# Patient Record
Sex: FEMALE | Race: WHITE | NOT HISPANIC OR LATINO | URBAN - METROPOLITAN AREA
[De-identification: names, ages, dates, MRNs, and addresses within clinical notes are randomized per-mention and may not be internally consistent; named-entity substitution may affect disease eponyms.]

---

## 2018-04-30 ENCOUNTER — OFFICE VISIT (OUTPATIENT)
Dept: OBGYN | Facility: CLINIC | Age: 33
End: 2018-04-30
Payer: COMMERCIAL

## 2018-04-30 VITALS
HEART RATE: 62 BPM | WEIGHT: 201 LBS | BODY MASS INDEX: 28.77 KG/M2 | DIASTOLIC BLOOD PRESSURE: 58 MMHG | SYSTOLIC BLOOD PRESSURE: 112 MMHG | HEIGHT: 70 IN

## 2018-04-30 DIAGNOSIS — Z80.0 FAMILY HISTORY OF COLON CANCER: ICD-10-CM

## 2018-04-30 DIAGNOSIS — Z01.419 ENCOUNTER FOR GYNECOLOGICAL EXAMINATION WITHOUT ABNORMAL FINDING: Primary | ICD-10-CM

## 2018-04-30 DIAGNOSIS — Z97.5 PRESENCE OF INTRAUTERINE CONTRACEPTIVE DEVICE: ICD-10-CM

## 2018-04-30 PROCEDURE — 99395 PREV VISIT EST AGE 18-39: CPT | Performed by: OBSTETRICS & GYNECOLOGY

## 2018-04-30 ASSESSMENT — PATIENT HEALTH QUESTIONNAIRE - PHQ9: 5. POOR APPETITE OR OVEREATING: NOT AT ALL

## 2018-04-30 ASSESSMENT — ANXIETY QUESTIONNAIRES
1. FEELING NERVOUS, ANXIOUS, OR ON EDGE: NOT AT ALL
GAD7 TOTAL SCORE: 0
3. WORRYING TOO MUCH ABOUT DIFFERENT THINGS: NOT AT ALL
7. FEELING AFRAID AS IF SOMETHING AWFUL MIGHT HAPPEN: NOT AT ALL
2. NOT BEING ABLE TO STOP OR CONTROL WORRYING: NOT AT ALL
5. BEING SO RESTLESS THAT IT IS HARD TO SIT STILL: NOT AT ALL
6. BECOMING EASILY ANNOYED OR IRRITABLE: NOT AT ALL
IF YOU CHECKED OFF ANY PROBLEMS ON THIS QUESTIONNAIRE, HOW DIFFICULT HAVE THESE PROBLEMS MADE IT FOR YOU TO DO YOUR WORK, TAKE CARE OF THINGS AT HOME, OR GET ALONG WITH OTHER PEOPLE: NOT DIFFICULT AT ALL

## 2018-04-30 NOTE — PROGRESS NOTES
Leandra is a 32 year old  female who presents for annual exam.     Besides routine health maintenance, she has no other health concerns today .    HPI:  The patient's PCP is [facility].    Patient is doing great.  Got an IUD after xander was born and not having any periods with it. Occasionally gets a little cramp or something but nothing more than that so loves it  Just discussed with Kvng if they want to remove it and start trying and decided to give it one more year  Leandra is an only child so would be fine with just one and Kvng would prefer one more. Likely will but will give it one more year  Just bought a house with bigger yard and really enjoying that  Hasn't had fasting labs in years if ever. Not fasting today. No biologic fhx of heart issues or DM/HTN  Patient's biological mom had colon ca at 46 and her mom had it early 50s. Not sure if she's done gene testing but did see a genetic counselor and told Leandra that they don't think there's a zambrano syndrome. Not sure if that's based on pedigree only or if she did do the lab testing. Leandra herself has no GI complaints  Her  Kvng has had years of GI issues and didn't go in until recently and found to have Crohn's. Just started remicade and is doing better      GYNECOLOGIC HISTORY:    No LMP recorded. Patient is not currently having periods (Reason: IUD).  Her current contraception method is: IUD.  She  reports that she has never smoked. She has never used smokeless tobacco.    Patient is sexually active.  STD testing offered?  Declined  Last PHQ-9 score on record =   PHQ-9 SCORE 2018   Total Score 0     Last GAD7 score on record =   SELIN-7 SCORE 2018   Total Score 0     Alcohol Score = 3    HEALTH MAINTENANCE:  Cholesterol: None found.  Last Mammo: Never, Result: not applicable  Pap:   Lab Results   Component Value Date    PAP NIL, HPV- 2016    PAP neg, -HPV 2013    PAP neg, neg 2011      Colonoscopy:  Never, Result:  "not applicable  Dexa:  Never    Health maintenance updated:  yes    HISTORY:  Obstetric History       T1      L1     SAB0   TAB0   Ectopic0   Multiple0   Live Births1       # Outcome Date GA Lbr Wei/2nd Weight Sex Delivery Anes PTL Lv   1 Term 16 40w3d 05:30 :58 7 lb 14.6 oz (3.59 kg) F Vag-Spont EPI N CELE      Name: Melina      Apgar1:  9                Apgar5: 9          Patient Active Problem List   Diagnosis     Family history of colon cancer     Presence of intrauterine contraceptive device     Past Surgical History:   Procedure Laterality Date     HC TOOTH EXTRACTION W/FORCEP      wisdom teeth extraction     tubes in ears        Social History   Substance Use Topics     Smoking status: Never Smoker     Smokeless tobacco: Never Used     Alcohol use 0.0 oz/week     0 Standard drinks or equivalent per week      Problem (# of Occurrences) Relation (Name,Age of Onset)    Colon Cancer (2) Maternal Grandmother (50), Mother (46)    Unknown/Adopted (1) Other            Current Outpatient Prescriptions   Medication Sig     levonorgestrel (MIRENA) 20 MCG/24HR IUD 1 each (20 mcg) by Intrauterine route once for 1 dose     No current facility-administered medications for this visit.      Facility-Administered Medications Ordered in Other Visits   Medication     lidocaine-EPINEPHrine 1.5 %-1:[id number] injection     ROPivacaine (NAROPIN) injection     Allergies   Allergen Reactions     Amoxicillin      Penicillins        Past medical, surgical, social and family histories were reviewed and updated in EPIC.    ROS:   12 point review of systems negative other than symptoms noted below.    EXAM:  /58  Pulse 62  Ht 5' 9.75\" (1.772 m)  Wt 201 lb (91.2 kg)  BMI 29.05 kg/m2   BMI: Body mass index is 29.05 kg/(m^2).    PHYSICAL EXAM:  Constitutional:  Appearance: Well nourished, well developed, alert, in no acute distress  Neck:  Lymph Nodes:  No lymphadenopathy present    Thyroid:  Gland size normal, " nontender, no nodules or masses present  on palpation  Chest:  Respiratory Effort:  Breathing unlabored  Cardiovascular:    Heart: Auscultation:  Regular rate, normal rhythm, no murmurs present  Breasts: Palpation of Breasts and Axillae:  No masses present on palpation, no breast tenderness. and No nodularity, asymmetry or nipple discharge bilaterally.  Gastrointestinal:   Abdominal Examination:  Abdomen nontender to palpation, tone normal without rigidity or guarding, no masses present, umbilicus without lesions   Liver and Spleen:  No hepatomegaly present, liver nontender to palpation    Hernias:  No hernias present  Lymphatic: Lymph Nodes:  No other lymphadenopathy present  Skin:  General Inspection:  No rashes present, no lesions present, no areas of  discoloration    Genitalia and Groin:  No rashes present, no lesions present, no areas of  discoloration, no masses present  Neurologic/Psychiatric:    Mental Status:  Oriented X3     Pelvic Exam:  External Genitalia:     Normal appearance for age, no discharge present, no tenderness present, no inflammatory lesions present, color normal  Vagina:    Normal vaginal vault without central or paravaginal defects, no discharge present, no inflammatory lesions present, no masses present  Bladder:     Nontender to palpation  Urethra:   Urethral Body:  Urethra palpation normal, urethra structural support normal   Urethral Meatus:  No erythema or lesions present  Cervix:     Appearance healthy, no lesions present, nontender to palpation, no bleeding present, string present  Uterus:     Nontender to palpation, no masses present, position anteflexed, mobility: normal  Adnexa:     No adnexal tenderness present, no adnexal masses present  Perineum:     Perineum within normal limits, no evidence of trauma, no rashes or skin lesions present  Anus:     Anus within normal limits, no hemorrhoids present  Inguinal Lymph Nodes:     No lymphadenopathy present  Pubic Hair:     Normal  pubic hair distribution for age  Genitalia and Groin:     No rashes present, no lesions present, no areas of discoloration, no masses present      COUNSELING:   Reviewed preventive health counseling, as reflected in patient instructions    BMI: Body mass index is 29.05 kg/(m^2).  Weight management plan: Discussed healthy diet and exercise guidelines and patient will follow up in 12 months in clinic to re-evaluate.    ASSESSMENT:  32 year old female with satisfactory annual exam.    ICD-10-CM    1. Encounter for gynecological examination without abnormal finding Z01.419    2. Presence of intrauterine contraceptive device Z97.5    3. Family history of colon cancer Z80.0        PLAN:  Pap is UTD for 3 more years    Discussed conception, IUD removal, etc. Will wait until next year    Discussed her genetic history of colon cancer. If her mom is gene negative then would start colonoscoyp at age 36 so that 10 yrs younger than her birth mom at dx. If her birth mother hasn't had gene testing and Leandra would like to do it we could do that and if positive would start screening even at this current age. Patient will get more details and let me know    Will plan fasting labs next year    Megha Emanuel MD

## 2018-04-30 NOTE — MR AVS SNAPSHOT
"              After Visit Summary   2018    Leandra Brush    MRN: [id number]           Patient Information     Date Of Birth          1985        Visit Information        Provider Department      2018 10:50 AM Megha Emanuel MD [facility]        Today's Diagnoses     Encounter for gynecological examination without abnormal finding    -  1    Presence of intrauterine contraceptive device        Family history of colon cancer           Follow-ups after your visit        Who to contact     If you have questions or need follow up information about today's clinic visit or your schedule please contact [facility] directly at [phone].  Normal or non-critical lab and imaging results will be communicated to you by [employer], letter or phone within 4 business days after the clinic has received the results. If you do not hear from us within 7 days, please contact the clinic through [employer] or phone. If you have a critical or abnormal lab result, we will notify you by phone as soon as possible.  Submit refill requests through [employer] or call your pharmacy and they will forward the refill request to us. Please allow 3 business days for your refill to be completed.          Additional Information About Your Visit        MyChart Information     CPUsage lets you send messages to your doctor, view your test results, renew your prescriptions, schedule appointments and more. To sign up, go to [email or web address] . Click on \"Log in\" on the left side of the screen, which will take you to the Welcome page. Then click on \"Sign up Now\" on the right side of the page.     You will be asked to enter the access code listed below, as well as some personal information. Please follow the directions to create your username and password.     Your access code is: [email or web address]  Expires: 2018 12:03 PM     Your access code will  in 90 days. If you need help or a new code, please " "call your [facility] clinic or [phone].        Care EveryWhere ID     This is your Care EveryWhere ID. This could be used by other organizations to access your [facility] medical records  [id number]        Your Vitals Were     Pulse Height BMI (Body Mass Index)             62 5' 9.75\" (1.772 m) 29.05 kg/m2          Blood Pressure from Last 3 Encounters:   04/30/18 112/58   04/04/16 102/64   03/07/16 112/72    Weight from Last 3 Encounters:   04/30/18 201 lb (91.2 kg)   04/04/16 209 lb (94.8 kg)   03/07/16 206 lb (93.4 kg)              Today, you had the following     No orders found for display       Primary Care Provider Office Phone # Fax #    [facility] [phone]       [facility] [address] [address]        Equal Access to Services     [facility]OBERT [facility] HospitalETHEL : Hadii aad ku hadasho Soomaali, waaxda luqadaha, qaybta kaalmada adeegyada, waxay idiin haywilmarn miladis gar . So [facility] [phone].    ATENCIÓN: Si habla español, tiene a carrizales disposición servicios gratuitos de asistencia lingüística. Rosendo al [phone].    We comply with applicable federal civil rights laws and [place] laws. We do not discriminate on the basis of race, color, national origin, age, disability, sex, sexual orientation, or gender identity.            Thank you!     Thank you for choosing [facility]  for your care. Our goal is always to provide you with excellent care. Hearing back from our patients is one way we can continue to improve our services. Please take a few minutes to complete the written survey that you may receive in the mail after your visit with us. Thank you!             Your Updated Medication List - Protect others around you: Learn how to safely use, store and throw away your medicines at www.disposemymeds.org.          This list is accurate as of 4/30/18 11:59 PM.  Always use your most recent med list.                   Brand Name Dispense " Instructions for use Diagnosis    levonorgestrel 20 MCG/24HR IUD    MIRENA (52 MG)    1 each    1 each (20 mcg) by Intrauterine route once for 1 dose    Encounter for IUD insertion

## 2018-05-01 ASSESSMENT — ANXIETY QUESTIONNAIRES: GAD7 TOTAL SCORE: 0

## 2018-05-01 ASSESSMENT — PATIENT HEALTH QUESTIONNAIRE - PHQ9: SUM OF ALL RESPONSES TO PHQ QUESTIONS 1-9: 0

## 2019-05-02 NOTE — PROGRESS NOTES
Leandra is a 33 year old  female who presents for annual exam.     Besides routine health maintenance, she has no other health concerns today .    HPI:  The patient's PCP is [facility].      Patient is doing well. Has loved her IUD these last three yrs and almost never gets any period at all. When she does it's almost always just a little brown smudge and not more.    Have really been on the fence about having another baby. She would be fine with just their daughter.  a little more ready but would be ok with just her too. Melina really asking for a baby sister.  Would have ideally tried for a baby 9 months ago so could be delivering now but her dad got sick with throat cancer and just . Never smoked or drank. Not sure if it was HPV related or not. So put it off b/c of that. 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 If don't get pregnant in that time frame is actually going to come back in early  and replace her IUD b/c likely won't keep trying after that.    General health is fine. Has gained some weight and knows it. Just hasn't had as much time to exercise or focus on diet with a 3 year old but then also with all the things going on with her dad. Hoping to get more focused on it this summer since off from school and things have settled down.    Taking a PNV but not very consistently. Going to recommit to that now that IUd is coming out      GYNECOLOGIC HISTORY:    No LMP recorded. (Menstrual status: IUD).  Her current contraception method is: IUD.  She  reports that she has never smoked. She has never used smokeless tobacco.    Patient is sexually active.  STD testing offered?  Declined  Last PHQ-9 score on record =   PHQ-9 SCORE 5/3/2019   PHQ-9 Total Score 0     Last GAD7 score on record =   SELIN-7 SCORE 5/3/2019   Total Score 0     Alcohol Score = 2    HEALTH MAINTENANCE:  Cholesterol: N/A  Last Mammo:  "never, Next Mammo: age 40  Pap: HPV: negative  Lab Results   Component Value Date    PAP NIL 2016    PAP neg, -HPV 2013    PAP neg, neg 2011      Colonoscopy:  never, Next Colonoscopy: age 50.  Dexa:  N/A    Health maintenance updated:  yes    HISTORY:  OB History    Para Term  AB Living   1 1 1 0 0 1   SAB TAB Ectopic Multiple Live Births   0 0 0 0 1      # Outcome Date GA Lbr Wei/2nd Weight Sex Delivery Anes PTL Lv   1 Term 16 40w3d 05:30 :58 3.59 kg (7 lb 14.6 oz) F Vag-Spont EPI N CELE      Birth Comments: followed and delivered by Adelfo      Name: Melina      Apgar1: 9  Apgar5: 9       Patient Active Problem List   Diagnosis     Family history of colon cancer     Past Surgical History:   Procedure Laterality Date     HC TOOTH EXTRACTION W/FORCEP      wisdom teeth extraction     tubes in ears        Social History     Tobacco Use     Smoking status: Never Smoker     Smokeless tobacco: Never Used   Substance Use Topics     Alcohol use: Yes     Alcohol/week: 0.0 oz      Problem (# of Occurrences) Relation (Name,Age of Onset)    Colon Cancer (2) Maternal Grandmother (50), Mother (46)    Unknown/Adopted (1) Other            No current outpatient medications on file.     No current facility-administered medications for this visit.      Allergies   Allergen Reactions     Amoxicillin      Penicillins        Past medical, surgical, social and family histories were reviewed and updated in EPIC.    ROS:   12 point review of systems negative other than symptoms noted below.    EXAM:  BP 96/56 (BP Location: Right arm, Patient Position: Sitting, Cuff Size: Adult Large)   Pulse 64   Ht 1.778 m (5' 10\")   Wt 96.3 kg (212 lb 6.4 oz)   Breastfeeding? No   BMI 30.48 kg/m     BMI: Body mass index is 30.48 kg/m .    PHYSICAL EXAM:  Constitutional:  Appearance: Well nourished, well developed, alert, in no acute distress  Neck:  Lymph Nodes:  No lymphadenopathy present    Thyroid:  " Gland size normal, nontender, no nodules or masses present  on palpation  Chest:  Respiratory Effort:  Breathing unlabored  Cardiovascular:    Heart: Auscultation:  Regular rate, normal rhythm, no murmurs present  Breasts: Palpation of Breasts and Axillae:  No masses present on palpation, no breast tenderness. and No nodularity, asymmetry or nipple discharge bilaterally.  Gastrointestinal:   Abdominal Examination:  Abdomen nontender to palpation, tone normal without rigidity or guarding, no masses present, umbilicus without lesions   Liver and Spleen:  No hepatomegaly present, liver nontender to palpation    Hernias:  No hernias present  Lymphatic: Lymph Nodes:  No other lymphadenopathy present  Skin:  General Inspection:  No rashes present, no lesions present, no areas of  discoloration    Genitalia and Groin:  No rashes present, no lesions present, no areas of  discoloration, no masses present  Neurologic/Psychiatric:    Mental Status:  Oriented X3     Pelvic Exam:  External Genitalia:     Normal appearance for age, no discharge present, no tenderness present, no inflammatory lesions present, color normal  Vagina:    Normal vaginal vault without central or paravaginal defects, no discharge present, no inflammatory lesions present, no masses present  Bladder:     Nontender to palpation  Urethra:   Urethral Body:  Urethra palpation normal, urethra structural support normal   Urethral Meatus:  No erythema or lesions present  Cervix:     Appearance healthy, no lesions present, nontender to palpation, no bleeding present, string present  Uterus:     Nontender to palpation, no masses present, position anteflexed, mobility: normal  Adnexa:     No adnexal tenderness present, no adnexal masses present  Perineum:     Perineum within normal limits, no evidence of trauma, no rashes or skin lesions present  Anus:     Anus within normal limits, no hemorrhoids present  Inguinal Lymph Nodes:     No lymphadenopathy present  Pubic  Hair:     Normal pubic hair distribution for age  Genitalia and Groin:     No rashes present, no lesions present, no areas of discoloration, no masses present      COUNSELING:   Reviewed preventive health counseling, as reflected in patient instructions  Special attention given to:        Family planning    BMI: Body mass index is 30.48 kg/m .  Weight management plan: Discussed healthy diet and exercise guidelines    ASSESSMENT:  33 year old female with satisfactory annual exam.    ICD-10-CM    1. Encounter for gynecological examination without abnormal finding Z01.419 Pap imaged thin layer screen with HPV - recommended age 30 - 65     HPV High Risk Types DNA Cervical   2. Encounter for removal of intrauterine contraceptive device Z30.432 REMOVE INTRAUTERINE DEVICE     REMOVAL NON-BIODEGRADABLE DRUG DELIVERY IMPLANT   3. Procreative management Z31.9        PLAN:  Pap is UTD for 2 more years  IUD removed as per note below  Discussed conceiving, timing, PNV, making sure she is comfortable with really not trying after only 3-4 months and replacing the IUD vs something shorter terms and then trying once more next summer. Will think about that but likely would just replace the IUD and not keep trying    Megha Emanuel MD    IUD Removal:  SUBJECTIVE:    Is a pregnancy test required: No.  Was a consent obtained?  Yes    Leandra Brush is a 33 year old female,, No LMP recorded. (Menstrual status: IUD). who presents today for IUD removal. Her current IUD was placed 3 years ago. She has not had problems with the IUD. She requests removal of the IUD because she desires to conceive    Today's PHQ-2 Score:   PHQ-2 (  [employer]) 2016   Q1: Little interest or pleasure in doing things 0   Q2: Feeling down, depressed or hopeless 0   PHQ-2 Score 0       PROCEDURE:    A speculum exam was performed and the cervix was visualized. The IUD string was visualized. Using ring forceps, the string  was grasped and the IUD removed  intact.    POST PROCEDURE:    The patient tolerated the procedure well. Patient was discharged in stable condition.    Call if bleeding, pain or fever occur. and Pregnancy counseling given, including folic acid supplementation 800-1000 mg per day.    Megha Emanuel MD

## 2019-05-03 ENCOUNTER — OFFICE VISIT (OUTPATIENT)
Dept: OBGYN | Facility: CLINIC | Age: 34
End: 2019-05-03
Payer: COMMERCIAL

## 2019-05-03 VITALS
HEART RATE: 64 BPM | WEIGHT: 212.4 LBS | HEIGHT: 70 IN | DIASTOLIC BLOOD PRESSURE: 56 MMHG | BODY MASS INDEX: 30.41 KG/M2 | SYSTOLIC BLOOD PRESSURE: 96 MMHG

## 2019-05-03 DIAGNOSIS — Z31.9 PROCREATIVE MANAGEMENT: ICD-10-CM

## 2019-05-03 DIAGNOSIS — Z30.432 ENCOUNTER FOR REMOVAL OF INTRAUTERINE CONTRACEPTIVE DEVICE: ICD-10-CM

## 2019-05-03 DIAGNOSIS — Z01.419 ENCOUNTER FOR GYNECOLOGICAL EXAMINATION WITHOUT ABNORMAL FINDING: Primary | ICD-10-CM

## 2019-05-03 PROCEDURE — 58301 REMOVE INTRAUTERINE DEVICE: CPT | Performed by: OBSTETRICS & GYNECOLOGY

## 2019-05-03 PROCEDURE — 99395 PREV VISIT EST AGE 18-39: CPT | Mod: 25 | Performed by: OBSTETRICS & GYNECOLOGY

## 2019-05-03 ASSESSMENT — PATIENT HEALTH QUESTIONNAIRE - PHQ9
SUM OF ALL RESPONSES TO PHQ QUESTIONS 1-9: 0
5. POOR APPETITE OR OVEREATING: NOT AT ALL

## 2019-05-03 ASSESSMENT — MIFFLIN-ST. JEOR: SCORE: 1748.69

## 2019-05-03 ASSESSMENT — ANXIETY QUESTIONNAIRES
2. NOT BEING ABLE TO STOP OR CONTROL WORRYING: NOT AT ALL
1. FEELING NERVOUS, ANXIOUS, OR ON EDGE: NOT AT ALL
IF YOU CHECKED OFF ANY PROBLEMS ON THIS QUESTIONNAIRE, HOW DIFFICULT HAVE THESE PROBLEMS MADE IT FOR YOU TO DO YOUR WORK, TAKE CARE OF THINGS AT HOME, OR GET ALONG WITH OTHER PEOPLE: NOT DIFFICULT AT ALL
3. WORRYING TOO MUCH ABOUT DIFFERENT THINGS: NOT AT ALL
GAD7 TOTAL SCORE: 0
7. FEELING AFRAID AS IF SOMETHING AWFUL MIGHT HAPPEN: NOT AT ALL
6. BECOMING EASILY ANNOYED OR IRRITABLE: NOT AT ALL
5. BEING SO RESTLESS THAT IT IS HARD TO SIT STILL: NOT AT ALL

## 2019-05-03 NOTE — LETTER
May 13, 2019    Leandra Brush  [address]  [place] [address]    Dear ,  This letter is regarding your recent Pap smear (cervical cancer screening) and Human Papillomavirus (HPV) test.  We are happy to inform you that your Pap smear result is normal. Cervical cancer is closely linked with certain types of HPV. Your results showed no evidence of high-risk HPV.  Therefore we recommend you return in 5 years for your next pap smear and HPV test.  You will still need to return to the clinic every year for an annual exam and other preventive tests.  If you have additional questions regarding this result, please call our registered nurse, Lynn at [phone].  Sincerely,    Megha Emanuel MD/kiya

## 2019-05-04 ASSESSMENT — ANXIETY QUESTIONNAIRES: GAD7 TOTAL SCORE: 0

## 2019-05-06 PROCEDURE — 87624 HPV HI-RISK TYP POOLED RSLT: CPT | Performed by: OBSTETRICS & GYNECOLOGY

## 2019-05-06 PROCEDURE — G0145 SCR C/V CYTO,THINLAYER,RESCR: HCPCS | Performed by: OBSTETRICS & GYNECOLOGY

## 2019-05-09 LAB
COPATH REPORT: NORMAL
PAP: NORMAL

## 2019-05-10 LAB
FINAL DIAGNOSIS: NORMAL
HPV HR 12 DNA CVX QL NAA+PROBE: NEGATIVE
HPV16 DNA SPEC QL NAA+PROBE: NEGATIVE
HPV18 DNA SPEC QL NAA+PROBE: NEGATIVE
SPECIMEN DESCRIPTION: NORMAL
SPECIMEN SOURCE CVX/VAG CYTO: NORMAL

## 2019-10-03 DIAGNOSIS — O36.80X0 PREGNANCY WITH INCONCLUSIVE FETAL VIABILITY: Primary | ICD-10-CM

## 2019-10-03 NOTE — PROGRESS NOTES
Pt has NOB scheduled with ultrasound. Needed order to be placed. Future ultrasound order placed and linked with appt. Closing encounter.   Jazmine Dial RN on 10/3/2019 at 2:54 PM

## 2019-10-07 ENCOUNTER — PRENATAL OFFICE VISIT (OUTPATIENT)
Dept: OBGYN | Facility: CLINIC | Age: 34
End: 2019-10-07
Payer: COMMERCIAL

## 2019-10-07 ENCOUNTER — ANCILLARY PROCEDURE (OUTPATIENT)
Dept: ULTRASOUND IMAGING | Facility: CLINIC | Age: 34
End: 2019-10-07
Payer: COMMERCIAL

## 2019-10-07 VITALS
WEIGHT: 215.8 LBS | SYSTOLIC BLOOD PRESSURE: 105 MMHG | HEART RATE: 73 BPM | HEIGHT: 70 IN | BODY MASS INDEX: 30.9 KG/M2 | DIASTOLIC BLOOD PRESSURE: 61 MMHG

## 2019-10-07 DIAGNOSIS — O36.80X0 PREGNANCY WITH INCONCLUSIVE FETAL VIABILITY: ICD-10-CM

## 2019-10-07 DIAGNOSIS — Z34.81 SUPERVISION OF NORMAL INTRAUTERINE PREGNANCY IN MULTIGRAVIDA, FIRST TRIMESTER: Primary | ICD-10-CM

## 2019-10-07 LAB
ABO + RH BLD: NORMAL
ABO + RH BLD: NORMAL
ALBUMIN UR-MCNC: NEGATIVE MG/DL
APPEARANCE UR: CLEAR
BILIRUB UR QL STRIP: NEGATIVE
BLD GP AB SCN SERPL QL: NORMAL
BLOOD BANK CMNT PATIENT-IMP: NORMAL
COLOR UR AUTO: YELLOW
ERYTHROCYTE [DISTWIDTH] IN BLOOD BY AUTOMATED COUNT: 13.1 % (ref 10–15)
GLUCOSE UR STRIP-MCNC: NEGATIVE MG/DL
HCT VFR BLD AUTO: 37.7 % (ref 35–47)
HGB BLD-MCNC: 12.7 G/DL (ref 11.7–15.7)
HGB UR QL STRIP: NEGATIVE
KETONES UR STRIP-MCNC: NEGATIVE MG/DL
LEUKOCYTE ESTERASE UR QL STRIP: NEGATIVE
MCH RBC QN AUTO: 30 PG (ref 26.5–33)
MCHC RBC AUTO-ENTMCNC: 33.7 G/DL (ref 31.5–36.5)
MCV RBC AUTO: 89 FL (ref 78–100)
NITRATE UR QL: NEGATIVE
PH UR STRIP: 6 PH (ref 5–7)
PLATELET # BLD AUTO: 262 10E9/L (ref 150–450)
RBC # BLD AUTO: 4.23 10E12/L (ref 3.8–5.2)
SOURCE: NORMAL
SP GR UR STRIP: 1.02 (ref 1–1.03)
SPECIMEN EXP DATE BLD: NORMAL
UROBILINOGEN UR STRIP-ACNC: 0.2 EU/DL (ref 0.2–1)
WBC # BLD AUTO: 12.4 10E9/L (ref 4–11)

## 2019-10-07 PROCEDURE — 87086 URINE CULTURE/COLONY COUNT: CPT | Performed by: OBSTETRICS & GYNECOLOGY

## 2019-10-07 PROCEDURE — 87389 HIV-1 AG W/HIV-1&-2 AB AG IA: CPT | Performed by: OBSTETRICS & GYNECOLOGY

## 2019-10-07 PROCEDURE — 86900 BLOOD TYPING SEROLOGIC ABO: CPT | Performed by: OBSTETRICS & GYNECOLOGY

## 2019-10-07 PROCEDURE — 86901 BLOOD TYPING SEROLOGIC RH(D): CPT | Performed by: OBSTETRICS & GYNECOLOGY

## 2019-10-07 PROCEDURE — 40000791 ZZHCL STATISTIC VERIFI PRENATAL TRISOMY 21,18,13: Mod: 90 | Performed by: OBSTETRICS & GYNECOLOGY

## 2019-10-07 PROCEDURE — 86762 RUBELLA ANTIBODY: CPT | Performed by: OBSTETRICS & GYNECOLOGY

## 2019-10-07 PROCEDURE — 86850 RBC ANTIBODY SCREEN: CPT | Performed by: OBSTETRICS & GYNECOLOGY

## 2019-10-07 PROCEDURE — 81003 URINALYSIS AUTO W/O SCOPE: CPT | Performed by: OBSTETRICS & GYNECOLOGY

## 2019-10-07 PROCEDURE — 86780 TREPONEMA PALLIDUM: CPT | Performed by: OBSTETRICS & GYNECOLOGY

## 2019-10-07 PROCEDURE — 99207 ZZC FIRST OB VISIT: CPT | Performed by: OBSTETRICS & GYNECOLOGY

## 2019-10-07 PROCEDURE — 85027 COMPLETE CBC AUTOMATED: CPT | Performed by: OBSTETRICS & GYNECOLOGY

## 2019-10-07 PROCEDURE — 99000 SPECIMEN HANDLING OFFICE-LAB: CPT | Performed by: OBSTETRICS & GYNECOLOGY

## 2019-10-07 PROCEDURE — 36415 COLL VENOUS BLD VENIPUNCTURE: CPT | Performed by: OBSTETRICS & GYNECOLOGY

## 2019-10-07 PROCEDURE — 76817 TRANSVAGINAL US OBSTETRIC: CPT | Performed by: OBSTETRICS & GYNECOLOGY

## 2019-10-07 PROCEDURE — 87340 HEPATITIS B SURFACE AG IA: CPT | Performed by: OBSTETRICS & GYNECOLOGY

## 2019-10-07 ASSESSMENT — ANXIETY QUESTIONNAIRES
3. WORRYING TOO MUCH ABOUT DIFFERENT THINGS: NOT AT ALL
GAD7 TOTAL SCORE: 0
7. FEELING AFRAID AS IF SOMETHING AWFUL MIGHT HAPPEN: NOT AT ALL
1. FEELING NERVOUS, ANXIOUS, OR ON EDGE: NOT AT ALL
5. BEING SO RESTLESS THAT IT IS HARD TO SIT STILL: NOT AT ALL
6. BECOMING EASILY ANNOYED OR IRRITABLE: NOT AT ALL
2. NOT BEING ABLE TO STOP OR CONTROL WORRYING: NOT AT ALL

## 2019-10-07 ASSESSMENT — MIFFLIN-ST. JEOR: SCORE: 1759.11

## 2019-10-07 ASSESSMENT — PATIENT HEALTH QUESTIONNAIRE - PHQ9
5. POOR APPETITE OR OVEREATING: NOT AT ALL
SUM OF ALL RESPONSES TO PHQ QUESTIONS 1-9: 0

## 2019-10-07 NOTE — PROGRESS NOTES
SUBJECTIVE:     HPI:    This is a 34 year old female patient,  who presents for her first obstetrical visit.    DELROY: 2020, by Last Menstrual Period.  She is 10w3d weeks.  Her cycles are regular.  Her last menstrual period was normal.   Since her LMP, she has experienced  nausea and fatigue).   She denies nausea, emesis, abdominal pain, headache, loss of appetite, vaginal discharge, dysuria, pelvic pain, urinary urgency, lightheadedness, urinary frequency, vaginal bleeding, hemorrhoids and constipation.      Additional History: patient had a finite window to try and got pregnant exactly the last month they were willing to try  LMP sure and her U/S is c/w in one day of her U/S  Feeling actually better this time than first preg even though wasn't that bad then either. Some fatigue but only nausea for one day  Had a flu shot at work already  Really hoping to deliver a little early this time for longer maternity leave    Have you travelled during the pregnancy?No  Have your sexual partner(s) travelled during the pregnancy?No      HISTORY:   Planned Pregnancy: Yes  Marital Status:   Occupation: teacher    Living in Household: Spouse and Children    Past History:  Her past medical history   Past Medical History:   Diagnosis Date     Family history of colon cancer 2016     NO ACTIVE PROBLEMS      Presence of intrauterine contraceptive device 2016-5/3/19    Mirena placed by Adelfo   .      She has a history of  none    Since her last LMP she denies use of alcohol, tobacco and street drugs.    Past medical, surgical, social and family history were reviewed and updated in Psych[facility].        Current Outpatient Medications   Medication     Prenatal MV-Min-Fe Fum-FA-DHA (PRENATAL 1 PO)     No current facility-administered medications for this visit.        ROS:   12 point review of systems negative other than symptoms noted below.  Constitutional: Fatigue  Gastrointestinal: Nausea      OBJECTIVE:  "    EXAM:  /61   Pulse 73   Ht 1.778 m (5' 10\")   Wt 97.9 kg (215 lb 12.8 oz)   LMP 2019   BMI 30.96 kg/m   Body mass index is 30.96 kg/m .    GENERAL: healthy, alert and no distress  NECK: no adenopathy, no asymmetry, masses, or scars and thyroid normal to palpation  RESP: lungs clear to auscultation - no rales, rhonchi or wheezes  BREAST: normal without masses, tenderness or nipple discharge and no palpable axillary masses or adenopathy  CV: regular rate and rhythm, normal S1 S2, no S3 or S4, no murmur, click or rub, no peripheral edema and peripheral pulses strong  ABDOMEN: soft, nontender, no hepatosplenomegaly, no masses and bowel sounds normal  MS: no gross musculoskeletal defects noted, no edema  SKIN: no suspicious lesions or rashes  NEURO: Normal strength and tone, mentation intact and speech normal  PSYCH: mentation appears normal, affect normal/bright    ASSESSMENT/PLAN:       ICD-10-CM    1. Supervision of normal intrauterine pregnancy in multigravida, first trimester Z34.81 *UA reflex to Microscopic     Non Invasive Prenatal Test Cell Free DNA     ABO/Rh type and screen     Hepatitis B surface antigen     CBC with platelets     HIV Antigen Antibody Combo     Rubella Antibody IgG Quantitative     Treponema Abs w Reflex to RPR and Titer     Urine Culture Aerobic Bacterial       34 year old , 10w3d weeks of pregnancy with DELROY of 2020, by Last Menstrual Period        Counseling given:   - Follow up in 4-6 weeks for return OB visit.  - Recommended weight gain for pregnancy: 25-35 lbs.         PLAN/PATIENT INSTRUCTIONS:    IUP @ 10+3 with dates today on U/S showing 10+4  Feeling great  Discussed NIPT testing with limitation, sensitivity/specificity. Would like to do that today along with NOB labs  Return 3 weeks and then again at 16 weeks     Megha Emanuel MD      Prenatal OB Questionnaire      Allergies as of 10/7/2019:    Allergies as of 10/07/2019 - Reviewed 10/07/2019 "   Allergen Reaction Noted     Amoxicillin  08/12/2015     Penicillins  08/12/2015       Current medications are:  Current Outpatient Medications   Medication Sig Dispense Refill     Prenatal MV-Min-Fe Fum-FA-DHA (PRENATAL 1 PO)

## 2019-10-08 LAB
BACTERIA SPEC CULT: NO GROWTH
HBV SURFACE AG SERPL QL IA: NONREACTIVE
HIV 1+2 AB+HIV1 P24 AG SERPL QL IA: NONREACTIVE
Lab: NORMAL
RUBV IGG SERPL IA-ACNC: 35 IU/ML
SPECIMEN SOURCE: NORMAL
T PALLIDUM AB SER QL: NONREACTIVE

## 2019-10-08 ASSESSMENT — ANXIETY QUESTIONNAIRES: GAD7 TOTAL SCORE: 0

## 2019-10-14 ENCOUNTER — TELEPHONE (OUTPATIENT)
Dept: OBGYN | Facility: CLINIC | Age: 34
End: 2019-10-14

## 2019-10-14 NOTE — TELEPHONE ENCOUNTER
Innatal results: negative    TEST RESULT INTERPRETATION   Chromosome 21 No aneuploidy detected  Results consistent with two copies of chromosome 21   Chromosome 18 No aneuploidy detected  Results consistent with two copies of chromosome 18   Chromosome 13 No aneuploidy detected  Results consistent with two copies of chromosome 13   Sex Chromosome No aneuploidy detected  Results consistent with two sex chromosomes:  male     Called pt with results. Left detailed vm with negative results. Encouraged pt to call and ask to speak with a triage nurse with questions or interested in knowing the gender of the baby.    Jazmine iDal RN on 10/14/2019 at 11:51 AM

## 2019-10-15 LAB — LAB SCANNED RESULT: NORMAL

## 2019-10-15 NOTE — TELEPHONE ENCOUNTER
Pt calling for gender to be sent in mail.  Sent gender reveal to home address as requested.  Jazmine Dial RN on 10/15/2019 at 11:56 AM

## 2019-10-28 ENCOUNTER — PRENATAL OFFICE VISIT (OUTPATIENT)
Dept: OBGYN | Facility: CLINIC | Age: 34
End: 2019-10-28
Payer: COMMERCIAL

## 2019-10-28 VITALS — DIASTOLIC BLOOD PRESSURE: 64 MMHG | WEIGHT: 215 LBS | BODY MASS INDEX: 30.85 KG/M2 | SYSTOLIC BLOOD PRESSURE: 108 MMHG

## 2019-10-28 DIAGNOSIS — Z34.82 SUPERVISION OF NORMAL INTRAUTERINE PREGNANCY IN MULTIGRAVIDA IN SECOND TRIMESTER: Primary | ICD-10-CM

## 2019-10-28 PROCEDURE — 99207 ZZC PRENATAL VISIT: CPT | Performed by: OBSTETRICS & GYNECOLOGY

## 2019-11-18 ENCOUNTER — PRENATAL OFFICE VISIT (OUTPATIENT)
Dept: OBGYN | Facility: CLINIC | Age: 34
End: 2019-11-18
Payer: COMMERCIAL

## 2019-11-18 VITALS — BODY MASS INDEX: 31.08 KG/M2 | WEIGHT: 216.6 LBS | SYSTOLIC BLOOD PRESSURE: 100 MMHG | DIASTOLIC BLOOD PRESSURE: 60 MMHG

## 2019-11-18 DIAGNOSIS — Z34.82 SUPERVISION OF NORMAL INTRAUTERINE PREGNANCY IN MULTIGRAVIDA IN SECOND TRIMESTER: Primary | ICD-10-CM

## 2019-11-18 DIAGNOSIS — Z36.1 NEED FOR MATERNAL SERUM ALPHA-PROTEIN (MSAFP) SCREENING: ICD-10-CM

## 2019-11-18 PROCEDURE — 99207 ZZC PRENATAL VISIT: CPT | Performed by: OBSTETRICS & GYNECOLOGY

## 2019-11-18 NOTE — PROGRESS NOTES
Feeling great. 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 Has had 2 HAs all pregnancy and no other concerns  Discussed AFP and pros/cons, her specific risk factors, etc. Patient declines at this time.  Will do anatomy scan in 4 wks

## 2019-12-16 ENCOUNTER — ANCILLARY PROCEDURE (OUTPATIENT)
Dept: ULTRASOUND IMAGING | Facility: CLINIC | Age: 34
End: 2019-12-16
Payer: COMMERCIAL

## 2019-12-16 ENCOUNTER — PRENATAL OFFICE VISIT (OUTPATIENT)
Dept: OBGYN | Facility: CLINIC | Age: 34
End: 2019-12-16
Payer: COMMERCIAL

## 2019-12-16 VITALS — WEIGHT: 218.8 LBS | SYSTOLIC BLOOD PRESSURE: 108 MMHG | BODY MASS INDEX: 31.39 KG/M2 | DIASTOLIC BLOOD PRESSURE: 60 MMHG

## 2019-12-16 DIAGNOSIS — Z34.82 SUPERVISION OF NORMAL INTRAUTERINE PREGNANCY IN MULTIGRAVIDA IN SECOND TRIMESTER: Primary | ICD-10-CM

## 2019-12-16 DIAGNOSIS — Z34.82 SUPERVISION OF NORMAL INTRAUTERINE PREGNANCY IN MULTIGRAVIDA IN SECOND TRIMESTER: ICD-10-CM

## 2019-12-16 DIAGNOSIS — O35.03X0 CHOROID PLEXUS CYST OF FETUS AFFECTING CARE OF MOTHER, ANTEPARTUM, SINGLE OR UNSPECIFIED FETUS: ICD-10-CM

## 2019-12-16 PROCEDURE — 76805 OB US >/= 14 WKS SNGL FETUS: CPT | Performed by: OBSTETRICS & GYNECOLOGY

## 2019-12-16 PROCEDURE — 99207 ZZC PRENATAL VISIT: CPT | Performed by: OBSTETRICS & GYNECOLOGY

## 2019-12-20 NOTE — PROGRESS NOTES
Patient had her anatomy scan done today and it is normal other than one choroid plexus cyst. All other anatomy is normal. Patient had normal NIPT testing earlier in pregnancy  Discussed that CPC is a marker of aneuploidy and usually T18. This is almost always associated with several anatomic abnormalities and we do not see those.  Discussed LII U/s and possible amnio. Discussed NIPT and it's sensitivity/specificity and its PPV/NPV.  Patient's baseline risk at 34 is quite low and with normal NIPT and o/w normal anatomy scan she feels comfortable with this being an isolated finding  Discussed repeat growth and scan for CPC resolution in one month. Understands that resolution of the CPC does not negate the association with aneuploidy but feels it would make her feel better if it was resolved and no other anatomic/growth issues were noted. If they were or CPC still there, would then want referral to MFM  O/w feeling some FM and overall feeling really well  Return 4 weeks with repeat growth and U/S

## 2020-01-15 ENCOUNTER — ANCILLARY PROCEDURE (OUTPATIENT)
Dept: ULTRASOUND IMAGING | Facility: CLINIC | Age: 35
End: 2020-01-15
Payer: COMMERCIAL

## 2020-01-15 ENCOUNTER — PRENATAL OFFICE VISIT (OUTPATIENT)
Dept: OBGYN | Facility: CLINIC | Age: 35
End: 2020-01-15
Payer: COMMERCIAL

## 2020-01-15 VITALS — SYSTOLIC BLOOD PRESSURE: 108 MMHG | WEIGHT: 221 LBS | DIASTOLIC BLOOD PRESSURE: 62 MMHG | BODY MASS INDEX: 31.71 KG/M2

## 2020-01-15 DIAGNOSIS — O35.03X0 CHOROID PLEXUS CYST OF FETUS AFFECTING CARE OF MOTHER, ANTEPARTUM, SINGLE OR UNSPECIFIED FETUS: ICD-10-CM

## 2020-01-15 DIAGNOSIS — Z34.82 SUPERVISION OF NORMAL INTRAUTERINE PREGNANCY IN MULTIGRAVIDA IN SECOND TRIMESTER: Primary | ICD-10-CM

## 2020-01-15 PROCEDURE — 99207 ZZC PRENATAL VISIT: CPT | Performed by: OBSTETRICS & GYNECOLOGY

## 2020-01-15 PROCEDURE — 76816 OB US FOLLOW-UP PER FETUS: CPT | Performed by: OBSTETRICS & GYNECOLOGY

## 2020-01-16 NOTE — PROGRESS NOTES
Patient had a repeat U/S as had CPC found at her anatomy scan. Discussed assoc. with aneuploidy but mostly T18. NIPT normal and no other anatomy issues  Repeat today shows resolution of CPC. Knows that the resolution is actually not an indicator that there is no aneuploidy but overall very low risk for that. The remained of intracranial anatomy was examined and completely normal  Growth overall is normal at 1-10#=54%  MVP normal. Baby is breech currently  Tons of FM  Had a small amount of weight gain.   Feeling well right now.  Return 4 weeks with gCT, cbc, tdap

## 2020-02-06 DIAGNOSIS — Z34.83 SUPERVISION OF NORMAL INTRAUTERINE PREGNANCY IN MULTIGRAVIDA IN THIRD TRIMESTER: Primary | ICD-10-CM

## 2020-02-06 DIAGNOSIS — Z23 NEED FOR TDAP VACCINATION: ICD-10-CM

## 2020-02-14 ENCOUNTER — PRENATAL OFFICE VISIT (OUTPATIENT)
Dept: OBGYN | Facility: CLINIC | Age: 35
End: 2020-02-14
Payer: COMMERCIAL

## 2020-02-14 VITALS — BODY MASS INDEX: 32.14 KG/M2 | DIASTOLIC BLOOD PRESSURE: 64 MMHG | SYSTOLIC BLOOD PRESSURE: 108 MMHG | WEIGHT: 224 LBS

## 2020-02-14 DIAGNOSIS — Z34.83 SUPERVISION OF NORMAL INTRAUTERINE PREGNANCY IN MULTIGRAVIDA IN THIRD TRIMESTER: Primary | ICD-10-CM

## 2020-02-14 DIAGNOSIS — Z23 NEED FOR TDAP VACCINATION: ICD-10-CM

## 2020-02-14 DIAGNOSIS — Z34.83 SUPERVISION OF NORMAL INTRAUTERINE PREGNANCY IN MULTIGRAVIDA IN THIRD TRIMESTER: ICD-10-CM

## 2020-02-14 PROBLEM — Z34.80 SUPERVISION OF NORMAL INTRAUTERINE PREGNANCY IN MULTIGRAVIDA: Status: ACTIVE | Noted: 2019-10-07

## 2020-02-14 LAB
ERYTHROCYTE [DISTWIDTH] IN BLOOD BY AUTOMATED COUNT: 13.4 % (ref 10–15)
GLUCOSE 1H P 50 G GLC PO SERPL-MCNC: 97 MG/DL (ref 60–129)
HCT VFR BLD AUTO: 35.7 % (ref 35–47)
HGB BLD-MCNC: 11.8 G/DL (ref 11.7–15.7)
MCH RBC QN AUTO: 29.7 PG (ref 26.5–33)
MCHC RBC AUTO-ENTMCNC: 33.1 G/DL (ref 31.5–36.5)
MCV RBC AUTO: 90 FL (ref 78–100)
PLATELET # BLD AUTO: 239 10E9/L (ref 150–450)
RBC # BLD AUTO: 3.97 10E12/L (ref 3.8–5.2)
WBC # BLD AUTO: 12.3 10E9/L (ref 4–11)

## 2020-02-14 PROCEDURE — 99207 ZZC PRENATAL VISIT: CPT | Performed by: OBSTETRICS & GYNECOLOGY

## 2020-02-14 PROCEDURE — 82950 GLUCOSE TEST: CPT | Performed by: OBSTETRICS & GYNECOLOGY

## 2020-02-14 PROCEDURE — 85027 COMPLETE CBC AUTOMATED: CPT | Performed by: OBSTETRICS & GYNECOLOGY

## 2020-02-14 PROCEDURE — 90715 TDAP VACCINE 7 YRS/> IM: CPT

## 2020-02-14 PROCEDURE — 90471 IMMUNIZATION ADMIN: CPT

## 2020-02-14 PROCEDURE — 36415 COLL VENOUS BLD VENIPUNCTURE: CPT | Performed by: OBSTETRICS & GYNECOLOGY

## 2020-02-14 NOTE — PROGRESS NOTES
Feeling great and really no concerns. Has had fairly slow weight gain this time and less than with Melina but feels like she looks much bigger this time  FH is 1cm larger so still on track  Unsure if cephalic on leopold's today. Seems like possibly could still be breech  Gct, cbc and tdap today  Return 2 weeks. And plan growth at 35 weeks

## 2020-02-14 NOTE — PROGRESS NOTES
Syphilis is a sexually transmitted disease that can cause birth defects in the babies of untreated mothers. Every pregnant patient is tested for syphilis early in each pregnancy as part of the routine lab work. The [place] Department of [facility] has seen an increase in the rate of syphilis in [place]. The [facility] now recommends testing for syphilis 3 times during a pregnancy, the new prenatal visit, 28 weeks and when admitted for delivery. Patient declines lab testing for syphilis.

## 2020-02-14 NOTE — PROGRESS NOTES
Prior to immunization administration, verified patients identity using patient s name and date of birth. Please see Immunization Activity for additional information.     Screening Questionnaire for Adult Immunization    Are you sick today?   No   Do you have allergies to medications, food, a vaccine component or latex?   No   Have you ever had a serious reaction after receiving a vaccination?   No   Do you have a long-term health problem with heart, lung, kidney, or metabolic disease (e.g., diabetes), asthma, a blood disorder, no spleen, complement component deficiency, a cochlear implant, or a spinal fluid leak?  Are you on long-term aspirin therapy?   No   Do you have cancer, leukemia, HIV/AIDS, or any other immune system problem?   No   Do you have a parent, brother, or sister with an immune system problem?   No   In the past 3 months, have you taken medications that affect  your immune system, such as prednisone, other steroids, or anticancer drugs; drugs for the treatment of rheumatoid arthritis, Crohn s disease, or psoriasis; or have you had radiation treatments?   No   Have you had a seizure, or a brain or other nervous system problem?   No   During the past year, have you received a transfusion of blood or blood    products, or been given immune (gamma) globulin or antiviral drug?   No   For women: Are you pregnant or is there a chance you could become       pregnant during the next month?  yes   Have you received any vaccinations in the past 4 weeks?   No     Immunization questionnaire answers were all negative.        Per orders of Dr. Emanuel, injection of Tdap given by Lexus Lowry CMA. Patient instructed to remain in clinic for 15 minutes afterwards, and to report any adverse reaction to me immediately.       Screening performed by Lexus Lowry CMA on 2/14/2020 at 10:15 AM.

## 2020-02-28 ENCOUNTER — PRENATAL OFFICE VISIT (OUTPATIENT)
Dept: OBGYN | Facility: CLINIC | Age: 35
End: 2020-02-28
Payer: COMMERCIAL

## 2020-02-28 VITALS — SYSTOLIC BLOOD PRESSURE: 100 MMHG | BODY MASS INDEX: 32.97 KG/M2 | WEIGHT: 229.8 LBS | DIASTOLIC BLOOD PRESSURE: 54 MMHG

## 2020-02-28 DIAGNOSIS — Z34.83 SUPERVISION OF NORMAL INTRAUTERINE PREGNANCY IN MULTIGRAVIDA IN THIRD TRIMESTER: Primary | ICD-10-CM

## 2020-02-28 PROCEDURE — 99207 ZZC PRENATAL VISIT: CPT | Performed by: OBSTETRICS & GYNECOLOGY

## 2020-02-28 NOTE — PROGRESS NOTES
Feeling great. 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 Tons of FM  Some occasional tightening here and there but nothing regular or consistent  No swelling and didn't swell with xander either. Gained about 20# with her. Thought she was at about 10# this time but is documented as 17 but still overall normal  Return 2 wks

## 2020-03-13 ENCOUNTER — PRENATAL OFFICE VISIT (OUTPATIENT)
Dept: OBGYN | Facility: CLINIC | Age: 35
End: 2020-03-13
Payer: COMMERCIAL

## 2020-03-13 VITALS — DIASTOLIC BLOOD PRESSURE: 60 MMHG | WEIGHT: 231.6 LBS | BODY MASS INDEX: 33.23 KG/M2 | SYSTOLIC BLOOD PRESSURE: 100 MMHG

## 2020-03-13 DIAGNOSIS — Z34.83 SUPERVISION OF NORMAL INTRAUTERINE PREGNANCY IN MULTIGRAVIDA IN THIRD TRIMESTER: Primary | ICD-10-CM

## 2020-03-13 PROCEDURE — 99207 ZZC PRENATAL VISIT: CPT | Performed by: OBSTETRICS & GYNECOLOGY

## 2020-03-13 NOTE — PROGRESS NOTES
Patient is doing great and really no concerns.  Has a left eye twitch. Encouraged more rest and sleep, warm compress and general heat to improve circulation and blood flow  Good FM, no ctx but definitely front and low tightening and BH frequently  No LOF no VB  Not noticing swelling. Gained 2# today and BP is great.  FH is actually a bit large today and had been mostly on track but growth U/s is planned for next time regardless

## 2020-03-24 ENCOUNTER — VIRTUAL VISIT (OUTPATIENT)
Dept: OBGYN | Facility: CLINIC | Age: 35
End: 2020-03-24
Payer: COMMERCIAL

## 2020-03-24 DIAGNOSIS — Z34.83 SUPERVISION OF NORMAL INTRAUTERINE PREGNANCY IN MULTIGRAVIDA IN THIRD TRIMESTER: Primary | ICD-10-CM

## 2020-03-24 PROCEDURE — 99207 ZZC PRENATAL VISIT: CPT | Performed by: OBSTETRICS & GYNECOLOGY

## 2020-03-24 NOTE — PROGRESS NOTES
".Leandra Brush is a 34 year old female who is being evaluated via a billable telephone visit.      The patient has been notified of following:     \"This telephone visit will be conducted via a call between you and your physician/provider. We have found that certain health care needs can be provided without the need for a physical exam.  This service lets us provide the care you need with a short phone conversation.  If a prescription is necessary we can send it directly to your pharmacy.  If lab work is needed we can place an order for that and you can then stop by our lab to have the test done at a later time.    If during the course of the call the physician/provider feels a telephone visit is not appropriate, you will not be charged for this service.\"     Leandra Brush complains of  No chief complaint on file.      I have reviewed and updated the patient's Past Medical History, Social History, Family History and Medication List.    ALLERGIES  Amoxicillin and Penicillins   Erica Laguerre, CMA        "

## 2020-03-25 NOTE — PROGRESS NOTES
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 Worries b/c vKng has crohn's and on meds and yet has to go to work. Discussed his job and physical distancing he can do for his own and her protection  Reviewed all things OB and covid related and hosp related that we know as now and will keep her apprised of changes  Discussed our new schedule, etc  Was to do a growth U/S today b/c of larger FH. However have pushed it to 36 weeks b/c will need to come in for that visit for GBS and cvx check anyway  Will cancel 37 weeks and then weekly from there.  Discussed pros/cons of induction in multip vs primip and the ARRIVE trial and balancing hosp resources. Will assess as we progress along

## 2020-04-10 ENCOUNTER — ANCILLARY PROCEDURE (OUTPATIENT)
Dept: ULTRASOUND IMAGING | Facility: CLINIC | Age: 35
End: 2020-04-10
Attending: OBSTETRICS & GYNECOLOGY
Payer: COMMERCIAL

## 2020-04-10 ENCOUNTER — PRENATAL OFFICE VISIT (OUTPATIENT)
Dept: OBGYN | Facility: CLINIC | Age: 35
End: 2020-04-10
Attending: OBSTETRICS & GYNECOLOGY
Payer: COMMERCIAL

## 2020-04-10 VITALS — WEIGHT: 235 LBS | BODY MASS INDEX: 33.72 KG/M2 | DIASTOLIC BLOOD PRESSURE: 58 MMHG | SYSTOLIC BLOOD PRESSURE: 98 MMHG

## 2020-04-10 DIAGNOSIS — Z34.83 SUPERVISION OF NORMAL INTRAUTERINE PREGNANCY IN MULTIGRAVIDA IN THIRD TRIMESTER: Primary | ICD-10-CM

## 2020-04-10 DIAGNOSIS — Z36.85 SCREENING, ANTENATAL, FOR STREPTOCOCCUS B: ICD-10-CM

## 2020-04-10 DIAGNOSIS — Z34.83 SUPERVISION OF NORMAL INTRAUTERINE PREGNANCY IN MULTIGRAVIDA IN THIRD TRIMESTER: ICD-10-CM

## 2020-04-10 PROCEDURE — 99207 ZZC PRENATAL VISIT: CPT | Performed by: OBSTETRICS & GYNECOLOGY

## 2020-04-10 PROCEDURE — 76816 OB US FOLLOW-UP PER FETUS: CPT | Performed by: OBSTETRICS & GYNECOLOGY

## 2020-04-10 PROCEDURE — 87653 STREP B DNA AMP PROBE: CPT | Performed by: OBSTETRICS & GYNECOLOGY

## 2020-04-10 NOTE — PROGRESS NOTES
annalisa U/s as FH large and is actually right on at 7-0#=65% 85/79/88. FL was only 4% but HL was 12.5%. MVP normal at 6.8cm  Feeling really quite good. Good FM, some BH but nothing regular or painful  No abnormal discharge or VB. 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 Posterior and thick and still high  Discussed elective induction of labor in setting of coronavirus so as to control for timing and childcare, hosp resources, etc and she is definitely open to that  Was going to have ripening with Melina so discussed it but then delivered at 40+3 w/o it. Would need ripening if were to do it now and have planned for 4/27 tentatively  Will do visit next week with a partner and then again with me the following week and check her cervix again to make a delivery plan  Patient was going to go unmedicated last time and then ended up with an epidural but really liked it and unopposed to another. However understands that if moves too quickly she may not be able to get it. Discussed unmedicated labor and if were to need a STAT with intubation the risks with coronavirus to her and the HCWs. Will see how it goes but is open to either option

## 2020-04-11 LAB
GP B STREP DNA SPEC QL NAA+PROBE: NEGATIVE
SPECIMEN SOURCE: NORMAL

## 2020-04-21 ENCOUNTER — PRENATAL OFFICE VISIT (OUTPATIENT)
Dept: OBGYN | Facility: CLINIC | Age: 35
End: 2020-04-21
Attending: OBSTETRICS & GYNECOLOGY
Payer: COMMERCIAL

## 2020-04-21 VITALS — WEIGHT: 238 LBS | SYSTOLIC BLOOD PRESSURE: 110 MMHG | DIASTOLIC BLOOD PRESSURE: 60 MMHG | BODY MASS INDEX: 34.15 KG/M2

## 2020-04-21 DIAGNOSIS — Z34.83 SUPERVISION OF NORMAL INTRAUTERINE PREGNANCY IN MULTIGRAVIDA IN THIRD TRIMESTER: Primary | ICD-10-CM

## 2020-04-21 PROCEDURE — 99207 ZZC PRENATAL VISIT: CPT | Performed by: OBSTETRICS & GYNECOLOGY

## 2020-04-21 NOTE — Clinical Note
Ripening of a multip with you 4/26 1930 and induction with me 4/27  Likely just cervidil since she is a multip and want her favorable for AROM but not to delivery overnight without me. However if very unfavorable, or ripening delayed later in to the night then prefer cytotec. Will put in orders but may need you to tweak the med depending on her exam and start time that night  thx

## 2020-04-26 ENCOUNTER — HOSPITAL ENCOUNTER (INPATIENT)
Facility: CLINIC | Age: 35
LOS: 3 days | Discharge: HOME | End: 2020-04-29
Attending: OBSTETRICS & GYNECOLOGY | Admitting: OBSTETRICS & GYNECOLOGY
Payer: COMMERCIAL

## 2020-04-26 PROBLEM — Z34.90 PREGNANCY: Status: ACTIVE | Noted: 2020-04-26

## 2020-04-26 LAB
ABO + RH BLD: NORMAL
ABO + RH BLD: NORMAL
BASOPHILS # BLD AUTO: 0 10E9/L (ref 0–0.2)
BASOPHILS NFR BLD AUTO: 0.2 %
BLD GP AB SCN SERPL QL: NORMAL
BLOOD BANK CMNT PATIENT-IMP: NORMAL
DIFFERENTIAL METHOD BLD: ABNORMAL
EOSINOPHIL # BLD AUTO: 0.2 10E9/L (ref 0–0.7)
EOSINOPHIL NFR BLD AUTO: 1.2 %
ERYTHROCYTE [DISTWIDTH] IN BLOOD BY AUTOMATED COUNT: 14.1 % (ref 10–15)
HCT VFR BLD AUTO: 34.5 % (ref 35–47)
HGB BLD-MCNC: 11.4 G/DL (ref 11.7–15.7)
IMM GRANULOCYTES # BLD: 0.1 10E9/L (ref 0–0.4)
IMM GRANULOCYTES NFR BLD: 0.4 %
LYMPHOCYTES # BLD AUTO: 1.8 10E9/L (ref 0.8–5.3)
LYMPHOCYTES NFR BLD AUTO: 13.3 %
MCH RBC QN AUTO: 29.2 PG (ref 26.5–33)
MCHC RBC AUTO-ENTMCNC: 33 G/DL (ref 31.5–36.5)
MCV RBC AUTO: 88 FL (ref 78–100)
MONOCYTES # BLD AUTO: 1.2 10E9/L (ref 0–1.3)
MONOCYTES NFR BLD AUTO: 8.8 %
NEUTROPHILS # BLD AUTO: 10.5 10E9/L (ref 1.6–8.3)
NEUTROPHILS NFR BLD AUTO: 76.1 %
NRBC # BLD AUTO: 0 10*3/UL
NRBC BLD AUTO-RTO: 0 /100
PLATELET # BLD AUTO: 220 10E9/L (ref 150–450)
RBC # BLD AUTO: 3.91 10E12/L (ref 3.8–5.2)
SARS-COV-2 RNA SPEC QL NAA+PROBE: NORMAL
SPECIMEN EXP DATE BLD: NORMAL
SPECIMEN SOURCE: NORMAL
WBC # BLD AUTO: 13.8 10E9/L (ref 4–11)

## 2020-04-26 PROCEDURE — 36415 COLL VENOUS BLD VENIPUNCTURE: CPT | Performed by: OBSTETRICS & GYNECOLOGY

## 2020-04-26 PROCEDURE — 12000000 ZZH R&B MED SURG/OB

## 2020-04-26 PROCEDURE — 87635 SARS-COV-2 COVID-19 AMP PRB: CPT | Performed by: OBSTETRICS & GYNECOLOGY

## 2020-04-26 PROCEDURE — 86850 RBC ANTIBODY SCREEN: CPT | Performed by: OBSTETRICS & GYNECOLOGY

## 2020-04-26 PROCEDURE — 86901 BLOOD TYPING SEROLOGIC RH(D): CPT | Performed by: OBSTETRICS & GYNECOLOGY

## 2020-04-26 PROCEDURE — 86780 TREPONEMA PALLIDUM: CPT | Performed by: OBSTETRICS & GYNECOLOGY

## 2020-04-26 PROCEDURE — 85025 COMPLETE CBC W/AUTO DIFF WBC: CPT | Performed by: OBSTETRICS & GYNECOLOGY

## 2020-04-26 PROCEDURE — 25000132 ZZH RX MED GY IP 250 OP 250 PS 637: Performed by: OBSTETRICS & GYNECOLOGY

## 2020-04-26 PROCEDURE — 3E0P7VZ INTRODUCTION OF HORMONE INTO FEMALE REPRODUCTIVE, VIA NATURAL OR ARTIFICIAL OPENING: ICD-10-PCS | Performed by: OBSTETRICS & GYNECOLOGY

## 2020-04-26 PROCEDURE — 86900 BLOOD TYPING SEROLOGIC ABO: CPT | Performed by: OBSTETRICS & GYNECOLOGY

## 2020-04-26 RX ORDER — IBUPROFEN 400 MG/1
800 TABLET, FILM COATED ORAL
Status: COMPLETED | OUTPATIENT
Start: 2020-04-26 | End: 2020-04-27

## 2020-04-26 RX ORDER — OXYTOCIN 10 [USP'U]/ML
10 INJECTION, SOLUTION INTRAMUSCULAR; INTRAVENOUS
Status: DISCONTINUED | OUTPATIENT
Start: 2020-04-26 | End: 2020-04-29 | Stop reason: HOSPADM

## 2020-04-26 RX ORDER — SODIUM CHLORIDE, SODIUM LACTATE, POTASSIUM CHLORIDE, CALCIUM CHLORIDE 600; 310; 30; 20 MG/100ML; MG/100ML; MG/100ML; MG/100ML
INJECTION, SOLUTION INTRAVENOUS CONTINUOUS
Status: DISCONTINUED | OUTPATIENT
Start: 2020-04-26 | End: 2020-04-29 | Stop reason: HOSPADM

## 2020-04-26 RX ORDER — OXYCODONE AND ACETAMINOPHEN 5; 325 MG/1; MG/1
1 TABLET ORAL
Status: DISCONTINUED | OUTPATIENT
Start: 2020-04-26 | End: 2020-04-29 | Stop reason: HOSPADM

## 2020-04-26 RX ORDER — OXYTOCIN/0.9 % SODIUM CHLORIDE 30/500 ML
100-340 PLASTIC BAG, INJECTION (ML) INTRAVENOUS CONTINUOUS PRN
Status: COMPLETED | OUTPATIENT
Start: 2020-04-26 | End: 2020-04-27

## 2020-04-26 RX ORDER — OXYTOCIN/0.9 % SODIUM CHLORIDE 30/500 ML
1-24 PLASTIC BAG, INJECTION (ML) INTRAVENOUS CONTINUOUS
Status: DISCONTINUED | OUTPATIENT
Start: 2020-04-26 | End: 2020-04-29 | Stop reason: HOSPADM

## 2020-04-26 RX ORDER — LIDOCAINE 40 MG/G
CREAM TOPICAL
Status: DISCONTINUED | OUTPATIENT
Start: 2020-04-26 | End: 2020-04-29 | Stop reason: HOSPADM

## 2020-04-26 RX ORDER — CARBOPROST TROMETHAMINE 250 UG/ML
250 INJECTION, SOLUTION INTRAMUSCULAR
Status: DISCONTINUED | OUTPATIENT
Start: 2020-04-26 | End: 2020-04-29 | Stop reason: HOSPADM

## 2020-04-26 RX ORDER — ONDANSETRON 2 MG/ML
4 INJECTION INTRAMUSCULAR; INTRAVENOUS EVERY 6 HOURS PRN
Status: DISCONTINUED | OUTPATIENT
Start: 2020-04-26 | End: 2020-04-29 | Stop reason: HOSPADM

## 2020-04-26 RX ORDER — METHYLERGONOVINE MALEATE 0.2 MG/ML
200 INJECTION INTRAVENOUS
Status: DISCONTINUED | OUTPATIENT
Start: 2020-04-26 | End: 2020-04-29 | Stop reason: HOSPADM

## 2020-04-26 RX ORDER — FENTANYL CITRATE 50 UG/ML
50-100 INJECTION, SOLUTION INTRAMUSCULAR; INTRAVENOUS
Status: DISCONTINUED | OUTPATIENT
Start: 2020-04-26 | End: 2020-04-29 | Stop reason: HOSPADM

## 2020-04-26 RX ORDER — TRANEXAMIC ACID 10 MG/ML
1 INJECTION, SOLUTION INTRAVENOUS EVERY 30 MIN PRN
Status: DISCONTINUED | OUTPATIENT
Start: 2020-04-26 | End: 2020-04-29 | Stop reason: HOSPADM

## 2020-04-26 RX ORDER — ACETAMINOPHEN 325 MG/1
650 TABLET ORAL EVERY 4 HOURS PRN
Status: DISCONTINUED | OUTPATIENT
Start: 2020-04-26 | End: 2020-04-29 | Stop reason: HOSPADM

## 2020-04-26 RX ORDER — NALOXONE HYDROCHLORIDE 0.4 MG/ML
.1-.4 INJECTION, SOLUTION INTRAMUSCULAR; INTRAVENOUS; SUBCUTANEOUS
Status: DISCONTINUED | OUTPATIENT
Start: 2020-04-26 | End: 2020-04-29 | Stop reason: HOSPADM

## 2020-04-26 RX ADMIN — DINOPROSTONE 10 MG: 10 INSERT VAGINAL at 20:33

## 2020-04-27 ENCOUNTER — ANESTHESIA EVENT (OUTPATIENT)
Dept: OBGYN | Facility: CLINIC | Age: 35
End: 2020-04-27
Payer: COMMERCIAL

## 2020-04-27 ENCOUNTER — ANESTHESIA (OUTPATIENT)
Dept: OBGYN | Facility: CLINIC | Age: 35
End: 2020-04-27
Payer: COMMERCIAL

## 2020-04-27 LAB
SARS-COV-2 PCR COMMENT: NORMAL
SARS-COV-2 RNA SPEC QL NAA+PROBE: NEGATIVE
SPECIMEN SOURCE: NORMAL
T PALLIDUM AB SER QL: NONREACTIVE

## 2020-04-27 PROCEDURE — 3E0R3BZ INTRODUCTION OF ANESTHETIC AGENT INTO SPINAL CANAL, PERCUTANEOUS APPROACH: ICD-10-PCS | Performed by: ANESTHESIOLOGY

## 2020-04-27 PROCEDURE — 10907ZC DRAINAGE OF AMNIOTIC FLUID, THERAPEUTIC FROM PRODUCTS OF CONCEPTION, VIA NATURAL OR ARTIFICIAL OPENING: ICD-10-PCS | Performed by: OBSTETRICS & GYNECOLOGY

## 2020-04-27 PROCEDURE — 0UQGXZZ REPAIR VAGINA, EXTERNAL APPROACH: ICD-10-PCS | Performed by: OBSTETRICS & GYNECOLOGY

## 2020-04-27 PROCEDURE — 59400 OBSTETRICAL CARE: CPT | Performed by: OBSTETRICS & GYNECOLOGY

## 2020-04-27 PROCEDURE — 25800030 ZZH RX IP 258 OP 636: Performed by: OBSTETRICS & GYNECOLOGY

## 2020-04-27 PROCEDURE — 25000128 H RX IP 250 OP 636: Performed by: ANESTHESIOLOGY

## 2020-04-27 PROCEDURE — 12000035 ZZH R&B POSTPARTUM

## 2020-04-27 PROCEDURE — 72200001 ZZH LABOR CARE VAGINAL DELIVERY SINGLE

## 2020-04-27 PROCEDURE — 25000125 ZZHC RX 250: Performed by: OBSTETRICS & GYNECOLOGY

## 2020-04-27 PROCEDURE — 37000011 ZZH ANESTHESIA WARD SERVICE

## 2020-04-27 PROCEDURE — 25000132 ZZH RX MED GY IP 250 OP 250 PS 637: Performed by: OBSTETRICS & GYNECOLOGY

## 2020-04-27 PROCEDURE — 00HU33Z INSERTION OF INFUSION DEVICE INTO SPINAL CANAL, PERCUTANEOUS APPROACH: ICD-10-PCS | Performed by: ANESTHESIOLOGY

## 2020-04-27 RX ORDER — AMOXICILLIN 250 MG
2 CAPSULE ORAL 2 TIMES DAILY
Status: DISCONTINUED | OUTPATIENT
Start: 2020-04-27 | End: 2020-04-29 | Stop reason: HOSPADM

## 2020-04-27 RX ORDER — NALBUPHINE HYDROCHLORIDE 10 MG/ML
2.5-5 INJECTION, SOLUTION INTRAMUSCULAR; INTRAVENOUS; SUBCUTANEOUS EVERY 6 HOURS PRN
Status: DISCONTINUED | OUTPATIENT
Start: 2020-04-27 | End: 2020-04-29 | Stop reason: HOSPADM

## 2020-04-27 RX ORDER — MODIFIED LANOLIN
OINTMENT (GRAM) TOPICAL
Status: DISCONTINUED | OUTPATIENT
Start: 2020-04-27 | End: 2020-04-29 | Stop reason: HOSPADM

## 2020-04-27 RX ORDER — ONDANSETRON 2 MG/ML
4 INJECTION INTRAMUSCULAR; INTRAVENOUS EVERY 6 HOURS PRN
Status: DISCONTINUED | OUTPATIENT
Start: 2020-04-27 | End: 2020-04-29 | Stop reason: HOSPADM

## 2020-04-27 RX ORDER — NALOXONE HYDROCHLORIDE 0.4 MG/ML
.1-.4 INJECTION, SOLUTION INTRAMUSCULAR; INTRAVENOUS; SUBCUTANEOUS
Status: DISCONTINUED | OUTPATIENT
Start: 2020-04-27 | End: 2020-04-29 | Stop reason: HOSPADM

## 2020-04-27 RX ORDER — ONDANSETRON 4 MG/1
4 TABLET, ORALLY DISINTEGRATING ORAL EVERY 6 HOURS PRN
Status: DISCONTINUED | OUTPATIENT
Start: 2020-04-27 | End: 2020-04-29 | Stop reason: HOSPADM

## 2020-04-27 RX ORDER — IBUPROFEN 400 MG/1
800 TABLET, FILM COATED ORAL EVERY 6 HOURS PRN
Status: DISCONTINUED | OUTPATIENT
Start: 2020-04-27 | End: 2020-04-29 | Stop reason: HOSPADM

## 2020-04-27 RX ORDER — HYDROCORTISONE 2.5 %
CREAM (GRAM) TOPICAL 3 TIMES DAILY PRN
Status: DISCONTINUED | OUTPATIENT
Start: 2020-04-27 | End: 2020-04-29 | Stop reason: HOSPADM

## 2020-04-27 RX ORDER — BISACODYL 10 MG
10 SUPPOSITORY, RECTAL RECTAL DAILY PRN
Status: DISCONTINUED | OUTPATIENT
Start: 2020-04-29 | End: 2020-04-29 | Stop reason: HOSPADM

## 2020-04-27 RX ORDER — AMOXICILLIN 250 MG
1 CAPSULE ORAL 2 TIMES DAILY
Status: DISCONTINUED | OUTPATIENT
Start: 2020-04-27 | End: 2020-04-29 | Stop reason: HOSPADM

## 2020-04-27 RX ORDER — ACETAMINOPHEN 325 MG/1
650 TABLET ORAL EVERY 4 HOURS PRN
Status: DISCONTINUED | OUTPATIENT
Start: 2020-04-27 | End: 2020-04-29 | Stop reason: HOSPADM

## 2020-04-27 RX ORDER — ROPIVACAINE HYDROCHLORIDE 2 MG/ML
10 INJECTION, SOLUTION EPIDURAL; INFILTRATION; PERINEURAL ONCE
Status: COMPLETED | OUTPATIENT
Start: 2020-04-27 | End: 2020-04-27

## 2020-04-27 RX ORDER — EPHEDRINE SULFATE 50 MG/ML
5 INJECTION, SOLUTION INTRAMUSCULAR; INTRAVENOUS; SUBCUTANEOUS
Status: DISCONTINUED | OUTPATIENT
Start: 2020-04-27 | End: 2020-04-29 | Stop reason: HOSPADM

## 2020-04-27 RX ADMIN — IBUPROFEN 800 MG: 400 TABLET, FILM COATED ORAL at 21:12

## 2020-04-27 RX ADMIN — IBUPROFEN 800 MG: 400 TABLET, FILM COATED ORAL at 15:34

## 2020-04-27 RX ADMIN — Medication 340 ML/HR: at 13:32

## 2020-04-27 RX ADMIN — SENNOSIDES AND DOCUSATE SODIUM 1 TABLET: 8.6; 5 TABLET ORAL at 21:12

## 2020-04-27 RX ADMIN — ACETAMINOPHEN 650 MG: 325 TABLET, FILM COATED ORAL at 15:34

## 2020-04-27 RX ADMIN — SODIUM CHLORIDE, POTASSIUM CHLORIDE, SODIUM LACTATE AND CALCIUM CHLORIDE: 600; 310; 30; 20 INJECTION, SOLUTION INTRAVENOUS at 11:51

## 2020-04-27 RX ADMIN — ROPIVACAINE HYDROCHLORIDE 10 ML: 2 INJECTION, SOLUTION EPIDURAL; INFILTRATION at 09:48

## 2020-04-27 RX ADMIN — ACETAMINOPHEN 650 MG: 325 TABLET, FILM COATED ORAL at 21:12

## 2020-04-27 RX ADMIN — SODIUM CHLORIDE, POTASSIUM CHLORIDE, SODIUM LACTATE AND CALCIUM CHLORIDE 1000 ML: 600; 310; 30; 20 INJECTION, SOLUTION INTRAVENOUS at 09:08

## 2020-04-27 RX ADMIN — Medication 2 MILLI-UNITS/MIN: at 09:28

## 2020-04-27 RX ADMIN — Medication 12 ML/HR: at 10:05

## 2020-04-27 NOTE — H&P
No significant change in general health status based on examination of the patient, review of Nursing Admission Database and prenatal record. Elective induction of labor for multip. Cervidil overnight for a FT dilation. Today is 2-3/70/-2 with a ctx and 2/40/-2 without one. AROM around 0830 with copious clear fluid. Plan is to start pitocin and get epidural asap. Patient has tested neg for CVD-19    EFW: 7lb 8oz

## 2020-04-27 NOTE — L&D DELIVERY NOTE
of viable male at 1328   Baby's weight unavaiable     Apgars 8, 9     Cord slung across shoulders and left hand up by face.  Small vaginal base tear w/o perineal extension. Repaired with running 3-0 vicryl suture    Primary OB: Adelfo

## 2020-04-27 NOTE — ANESTHESIA PREPROCEDURE EVALUATION
"Anesthesia Pre-Procedure Evaluation    Patient: Leandra Brush   MRN: [id number] : 1985          Preoperative Diagnosis: * No pre-op diagnosis entered *    * No procedures listed *    Past Medical History:   Diagnosis Date     Family history of colon cancer 2016     NO ACTIVE PROBLEMS      Presence of intrauterine contraceptive device 2016-5/3/19    Mirena placed by Adelfo     Past Surgical History:   Procedure Laterality Date     HC TOOTH EXTRACTION W/FORCEP      wisdom teeth extraction     tubes in ears         Anesthesia Evaluation     .             ROS/MED HX    ENT/Pulmonary:  - neg pulmonary ROS     Neurologic:  - neg neurologic ROS     Cardiovascular:  - neg cardiovascular ROS       METS/Exercise Tolerance:     Hematologic:         Musculoskeletal:         GI/Hepatic:     (+) GERD       Renal/Genitourinary:         Endo:         Psychiatric:         Infectious Disease:         Malignancy:         Other:                     neg OB ROS            Physical Exam      Airway   Mallampati: III  TM distance: > 3 FB  Neck ROM: full    Dental     Cardiovascular       Pulmonary             Lab Results   Component Value Date    WBC 13.8 (H) 2020    HGB 11.4 (L) 2020    HCT 34.5 (L) 2020     2020       Preop Vitals  BP Readings from Last 3 Encounters:   20 105/69   20 110/60   04/10/20 98/58    Pulse Readings from Last 3 Encounters:   20 72   10/07/19 73   19 64      Resp Readings from Last 3 Encounters:   20 16   16 18   16 16    SpO2 Readings from Last 3 Encounters:   16 94%      Temp Readings from Last 1 Encounters:   20 36.4  C (97.5  F) (Temporal)    Ht Readings from Last 1 Encounters:   10/07/19 1.778 m (5' 10\")      Wt Readings from Last 1 Encounters:   20 108 kg (238 lb)    Estimated body mass index is 34.15 kg/m  as calculated from the following:    Height as of 10/7/19: 1.778 m (5' 10\").    Weight as of " 4/21/20: 108 kg (238 lb).       Anesthesia Plan      History & Physical Review  History and physical reviewed and following examination; no interval change.    ASA Status:  2 .  OB Epidural Asa: 2       Plan for Epidural            Postoperative Care      Consents  Anesthetic plan, risks, benefits and alternatives discussed with:  Patient..                 Shahram Benitez MD

## 2020-04-27 NOTE — PLAN OF CARE
Data: Leandra Brush transferred to 426 via wheelchair at 1600. Baby transferred via parent's arms.  Action: Receiving unit notified of transfer: Yes. Patient and family notified of room change. Report given to Karla Monge RN at 1610. Belongings sent to receiving unit. Accompanied by Registered Nurse. Oriented patient to surroundings. Call light within reach. ID bands double-checked with receiving RN.  Response: Patient tolerated transfer and is stable.

## 2020-04-27 NOTE — PLAN OF CARE
Pt VSS, cervidil has been in since 2030.  Pt not feeling any contractions, denies pain.  Pt slept on and off all night.

## 2020-04-27 NOTE — PLAN OF CARE
Pt states she has some cramping and right hip discomfort. Otherwise doing well. Bryon 1.5-4 minutes.Talking through ctx. POC discussed. Pt getting ready to eat breakfast

## 2020-04-27 NOTE — PLAN OF CARE
Oriented to postpartum. Vital signs stable. Postpartum assessment WDL. Pain controlled with tylenol and ibuprofen. Patient voiding without difficulty. Breastfeeding on cue. Patient and infant bonding well. Will continue with current plan of care.

## 2020-04-27 NOTE — PLAN OF CARE
Multip admitted to room 218 for cervical ripening.  Monitors applied with pt's consent.  Admission questions completed, safety and call light reviewed, POC discussed.  Pt agrees with POC.  IV inserted with saline lock.  Will check cervix and place cervidil to begin cervical ripening.

## 2020-04-27 NOTE — ANESTHESIA PROCEDURE NOTES
Procedure note : epidural catheter  Staff -   Anesthesiologist:  Shahram Benitez MD      Performed By: anesthesiologist        Pre-Procedure  Performed by Shahram Benitez MD  Location: OB      Pre-Anesthestic Checklist: patient identified, IV checked, risks and benefits discussed, informed consent, monitors and equipment checked, pre-op evaluation and at physician/surgeon's request    Timeout  Correct Patient: Yes   Correct Procedure: Yes   Correct Site: Yes   Correct Laterality: N/A   Correct Position: Yes   Site Marked: N/A   .   Procedure Documentation    .    Procedure: epidural catheter, .   Patient Position:sitting Insertion Site:L2-3  (midline approach) Injection technique: LORT saline   Local skin infiltrated with mL of 1% lidocaine.  MATT at 5 cm    Patient Prep/Sterile Barriers; mask, sterile gloves, povidone-iodine 7.5% surgical scrub, patient draped.  .  Needle: Touhy needle   Needle Gauge: 17.    Needle Length (Inches) 5   # of attempts: 1 and # of redirects: : 0. .    Catheter: 19 G . .  Catheter threaded easily  5 cm epidural space.  10 cm at skin.   .    Assessment/Narrative  Paresthesias: No.  .  .  No aspiration negative for heme or CSF  . Test dose of 3 mL lidocaine 1.5% w/ 1:200,000 epinephrine at. Test dose negative for signs of intravascular, subdural or intrathecal injection. Comments:  Pt tolerated well.    Taped sterile and secure.   FHTs stable post-procedure.   No complications.

## 2020-04-28 PROCEDURE — 25000132 ZZH RX MED GY IP 250 OP 250 PS 637: Performed by: OBSTETRICS & GYNECOLOGY

## 2020-04-28 PROCEDURE — 12000035 ZZH R&B POSTPARTUM

## 2020-04-28 RX ADMIN — SENNOSIDES AND DOCUSATE SODIUM 1 TABLET: 8.6; 5 TABLET ORAL at 20:46

## 2020-04-28 RX ADMIN — ACETAMINOPHEN 650 MG: 325 TABLET, FILM COATED ORAL at 17:38

## 2020-04-28 RX ADMIN — IBUPROFEN 800 MG: 400 TABLET, FILM COATED ORAL at 03:15

## 2020-04-28 RX ADMIN — IBUPROFEN 800 MG: 400 TABLET, FILM COATED ORAL at 17:38

## 2020-04-28 RX ADMIN — SENNOSIDES AND DOCUSATE SODIUM 1 TABLET: 8.6; 5 TABLET ORAL at 10:33

## 2020-04-28 RX ADMIN — ACETAMINOPHEN 650 MG: 325 TABLET, FILM COATED ORAL at 03:15

## 2020-04-28 RX ADMIN — ACETAMINOPHEN 650 MG: 325 TABLET, FILM COATED ORAL at 10:33

## 2020-04-28 RX ADMIN — IBUPROFEN 800 MG: 400 TABLET, FILM COATED ORAL at 10:33

## 2020-04-28 NOTE — PLAN OF CARE
Vital signs stable. Postpartum assessment WDL. Pain controlled with tylenol and ibuprofen. Patient voiding without difficulty. Breastfeeding on cue. Patient and infant bonding well. Will continue with current plan of care.  Planning to discharge home today.

## 2020-04-28 NOTE — ANESTHESIA POSTPROCEDURE EVALUATION
Patient: Leandra Brush    * No procedures listed *    Diagnosis:* No pre-op diagnosis entered *  Diagnosis Additional Information: No value filed.    Anesthesia Type:  No value filed.    Note:  Anesthesia Post Evaluation    Patient location during evaluation: Floor (Postpartum Unit)  Patient participation: Able to fully participate in evaluation  Level of consciousness: awake and alert  Pain management: adequate  Airway patency: patent  Cardiovascular status: acceptable and hemodynamically stable  Respiratory status: acceptable, spontaneous ventilation and unassisted  Hydration status: acceptable  PONV: none       Comments: Pt denies epidural-related complaints.         Last vitals:  Vitals:    04/28/20 0315 04/28/20 0400 04/28/20 0800   BP:   125/71   Pulse:      Resp: 16 16 16   Temp:   36.5  C (97.7  F)   SpO2:            Electronically Signed By: Kyle Mari MD  April 28, 2020  2:27 PM

## 2020-04-28 NOTE — LACTATION NOTE
"Routine and discharge visit Leandra, FOB, and baby boy. Leandra reports breast feeding is off to a great start, \"this baby has a great latch and seems to know what he's doing!\" Provided lanolin. Discussed  feeding patterns and \"clusterfeeding.\" This is Leandra's second baby and she does have experience breast feeding her first child.    Reviewed breastfeeding positions, latch, lip placement, and pinching of nipple (how to assess proper latch). Discussed physiology of milk production from colostrum through milk coming in. Discussed pumping (when it's helpful, when it's necessary, and when to begin pumping for milk storage). Reviewed plugged milk ducts, mastitis, safe sleep, and safety of baby. Discussed normal infant weight loss and when infant should be back to birth weight. Provided handout with [place] Breastfeeding video links.    Recommend unlimited, frequent breast feedings: At least 8 - 12 times every 24 hours. Avoid pacifiers and supplementation with formula unless medically indicated. Encouraged use of feeding log and to record feedings, and void/stool patterns. Reviewed breastfeeding section in [facility] patient education booklet. Leandra would like to discharge with a Medela breast pump for home use. Follow up with Pediatrician, encouraged lactation follow up. Reviewed outpatient lactation resources. Appreciative of visit.    Luciana Dwyer RN, Lactation Educator  "

## 2020-04-28 NOTE — LACTATION NOTE
Initial Lactation visit with LeandraKvng, and baby boy. Baby swaddled and held by mother.  Leandra reports feeding is going well so far. Nipples are feeling sore, has lanolin. Education provided on proper latch and nipple shape post feed. Encouraged latch checks. Recommend unlimited, frequent breast feedings: At least 8 - 12 times every 24 hours. Recommended rooming in. Instructed in hand expression. Avoid pacifiers and supplementation with formula unless medically indicated. Explained benefits of holding baby skin on skin to help promote better breastfeeding outcomes. Leandra wants a pump for home use. Will revisit as needed.      Jodi Ruiz RN  Lactation Educator

## 2020-04-28 NOTE — PLAN OF CARE
Pt is post vaginal delivery day 1. A/O. VSS. Fundus firm, U/1. Scant rubra lochia. Pain controlled with tylenol and ibuprofen. Breastfeeding well. Nipple discomfort, using lanolin. Vdg adequately. +flatus, -BM. Up ad demi. Bonding well with infant.  attentive at bedside. Plan for discharge to home today pending progress.

## 2020-04-28 NOTE — PLAN OF CARE
Vital signs stable. Postpartum assessment WDL. Pain controlled with tylenol and ibuprofen. Patient voiding without difficulty. Breastfeeding on cue. Patient and infant bonding well. Did not discharge d/t baby staying on bili lights. Will continue with current plan of care.

## 2020-04-28 NOTE — PROGRESS NOTES
S: Pt doing well. Infant is being . Pain is well controlled.    O: /69   Pulse 72   Temp 98.3  F (36.8  C) (Oral)   Resp 16   LMP 07/26/2019   SpO2 96%   Breastfeeding Unknown         ABD:  Uterine fundus is firm, non-tender and at the level of the umbilicus                Hemoglobin   Date Value Ref Range Status   04/26/2020 11.4 (L) 11.7 - 15.7 g/dL Final            Lab Results   Component Value Date    RH Pos 04/26/2020       A:  Post-partum day #1 s/p Normal spontaneous vaginal delivery    P: Continue PP Cares     Discharge       Patient has ibuprofen, tylenol and miralax at home already

## 2020-04-29 VITALS
HEART RATE: 72 BPM | RESPIRATION RATE: 16 BRPM | SYSTOLIC BLOOD PRESSURE: 108 MMHG | TEMPERATURE: 97.9 F | OXYGEN SATURATION: 96 % | DIASTOLIC BLOOD PRESSURE: 65 MMHG

## 2020-04-29 PROCEDURE — 25000132 ZZH RX MED GY IP 250 OP 250 PS 637: Performed by: OBSTETRICS & GYNECOLOGY

## 2020-04-29 RX ADMIN — IBUPROFEN 800 MG: 400 TABLET, FILM COATED ORAL at 01:11

## 2020-04-29 RX ADMIN — ACETAMINOPHEN 650 MG: 325 TABLET, FILM COATED ORAL at 01:11

## 2020-04-29 RX ADMIN — IBUPROFEN 800 MG: 400 TABLET, FILM COATED ORAL at 07:23

## 2020-04-29 RX ADMIN — SENNOSIDES AND DOCUSATE SODIUM 2 TABLET: 8.6; 5 TABLET ORAL at 07:23

## 2020-04-29 RX ADMIN — ACETAMINOPHEN 650 MG: 325 TABLET, FILM COATED ORAL at 13:14

## 2020-04-29 RX ADMIN — ACETAMINOPHEN 650 MG: 325 TABLET, FILM COATED ORAL at 07:23

## 2020-04-29 RX ADMIN — IBUPROFEN 800 MG: 400 TABLET, FILM COATED ORAL at 13:14

## 2020-04-29 NOTE — PLAN OF CARE
VSS Pt using Ibuprofen and tylenol for pain control. Up independently in the room. Breastfeeding infant on demand, latching well. Pt has been discharged. Waiting for infant discharge to see if they leave this afternoon or stay in the room with infant.

## 2020-04-29 NOTE — PLAN OF CARE
VSS. Postpartum assessment WDL. Voiding and ambulating. Taking tylenol and ibuprofen, declining pain. Nipples tender from breastfeeding, using lanolin. Breastfeeding infant independent. Medela pump given. Encouraged to call with question or concerns.

## 2020-04-29 NOTE — PROGRESS NOTES
[facility]    Post-Partum Progress Note    Assessment & Plan   Assessment:  Post-partum day #2  Normal spontaneous vaginal delivery    Doing well.  No excessive bleeding  Pain well-controlled.    Plan:  Ambulation encouraged  Pain control measures as needed  Reportable signs and symptoms dicussed with the patient  Discharge later today --will follow up with Dr. Emanuel in 6 weeks for PP visit and IUD placement    Sangeetha Moya     Interval History   Doing well.  Pain is well-controlled -minimal cramping.  No fevers.  No history of foul-smelling vaginal discharge.  Good appetite.  Denies chest pain, shortness of breath, nausea or vomiting.  Vaginal bleeding is similar to a light menstrual flow.  Ambulatory.  Breastfeeding well.  No issues overnight; baby under bili lights    Medications     lactated ringers 125 mL/hr at 04/27/20 1151     - MEDICATION INSTRUCTIONS -       - MEDICATION INSTRUCTIONS -       - MEDICATION INSTRUCTIONS -       - MEDICATION INSTRUCTIONS -       - MEDICATION INSTRUCTIONS -       NO Rho (D) immune globulin (RhoGam) needed - mother Rh POSITIVE       - MEDICATION INSTRUCTIONS -       oxytocin in 0.9% NaCl Stopped (04/27/20 1328)       dinoprostone   Vaginal Once     fentaNYL (SUBLIMAZE) 2 mcg/mL, bupivacaine (MARCAINE) 0.125% in NaCl 0.9% for PCEA  12 mL/hr EPIDURAL PCEA     - MEDICATION INSTRUCTIONS -   Does not apply See Admin Instructions    Or     - MEDICATION INSTRUCTIONS -   Does not apply See Admin Instructions    Or     - MEDICATION INSTRUCTIONS -   Does not apply See Admin Instructions    Or     - MEDICATION INSTRUCTIONS -   Does not apply See Admin Instructions     senna-docusate  1 tablet Oral BID    Or     senna-docusate  2 tablet Oral BID     sodium chloride (PF)  3 mL Intracatheter Q8H       Physical Exam   Temp: 97.8  F (36.6  C) Temp src: Oral BP: 117/47   Heart Rate: 65 Resp: 16        There were no vitals filed for this visit.  Vital Signs with  Ranges  Temp:  [97.7  F (36.5  C)-98.2  F (36.8  C)] 97.8  F (36.6  C)  Heart Rate:  [65-81] 65  Resp:  [16] 16  BP: (117-125)/(47-71) 117/47  No intake/output data recorded.    Uterine fundus is firm, non-tender and at the level of the umbilicus  Extremities Non-tender    Data   Recent Labs   Lab Test 04/26/20 2028   ABO O   RH Pos   AS Neg     Recent Labs   Lab Test 04/26/20 2028 02/14/20  0902   HGB 11.4* 11.8     Recent Labs   Lab Test 10/07/19  1357   RUQIGG 35

## 2020-04-29 NOTE — LACTATION NOTE
Routine and discharge visit with Leandra, Kvng, and baby Iraida.   Leandra was nursing Kvng in cradle hold on R breast at time of visit. Infant latched well, nutritive suck pattern observed. Iraida is receiving phototherapy. Leandra was hold biliblanket against infant while breast feeding him.     Reviewed breastfeeding positions, latch, lip placement, and pinching of nipple (how to assess proper latch). Discussed physiology of milk production from colostrum through milk coming in. Discussed pumping (when it's helpful, when it's necessary, and when to begin pumping for milk storage). Reviewed plugged milk ducts, mastitis, safe sleep, and safety of baby. Discussed normal infant weight loss and when infant should be back to birth weight. Provided handout with [employer] Breastfeeding video links.    Recommend unlimited, frequent breast feedings: At least 8 - 12 times every 24 hours. Avoid pacifiers and supplementation with formula unless medically indicated. Encouraged use of feeding log and to record feedings, and void/stool patterns. Reviewed breastfeeding section in [facility] Beginning patient education booklet. Leandra has a pump for home use. Follow up with Pediatrician, encouraged lactation follow up. Reviewed outpatient lactation resources. Appreciative of visit.    Luciana Dwyer RN, Lactation Educator

## 2020-04-30 ENCOUNTER — LACTATION ENCOUNTER (OUTPATIENT)
Age: 35
End: 2020-04-30

## 2020-04-30 NOTE — LACTATION NOTE
This note was copied from a baby's chart.  Routine visit with Leandra, FOB and baby.  Getting ready for discharge.  Plan: Watch for feeding cues and feed every 2-3 hours and/or on demand. Continue to use feeding log to track intake and appropriate voids and stools. Take feeding log to first follow up appointment or weight check. Encourage skin to skin to promote frequent feedings, thermoregulation and bonding. Follow-up with healthcare provider or lactation consultant for questions or concerns.     Instructed on signs/symptoms of engorgement/ plugged ducts and mastitis.  Instructed on comfort measures and when to call MD.   Has a breast pump for home.    Continues to nurse well per mom. No further questions at this time.   Will follow as needed.   Vanesa CAMACHON, RN, PHN, RNC-MNN, IBCLC

## 2020-05-07 ENCOUNTER — TELEPHONE (OUTPATIENT)
Dept: OBGYN | Facility: CLINIC | Age: 35
End: 2020-05-07

## 2020-05-07 NOTE — TELEPHONE ENCOUNTER
Type of Paperwork received:  FMLA     Date Rcvd:  5/7/2020    Rcvd From (Company name): [facility]    Provider:  Adelfo gee Provider Cart Date:  05/07/2020

## 2020-06-10 ENCOUNTER — PRENATAL OFFICE VISIT (OUTPATIENT)
Dept: OBGYN | Facility: CLINIC | Age: 35
End: 2020-06-10
Payer: COMMERCIAL

## 2020-06-10 VITALS — SYSTOLIC BLOOD PRESSURE: 110 MMHG | DIASTOLIC BLOOD PRESSURE: 70 MMHG | BODY MASS INDEX: 30.85 KG/M2 | WEIGHT: 215 LBS

## 2020-06-10 DIAGNOSIS — Z30.430 ENCOUNTER FOR INSERTION OF INTRAUTERINE CONTRACEPTIVE DEVICE: ICD-10-CM

## 2020-06-10 PROBLEM — Z34.80 SUPERVISION OF NORMAL INTRAUTERINE PREGNANCY IN MULTIGRAVIDA: Status: RESOLVED | Noted: 2019-10-07 | Resolved: 2020-06-10

## 2020-06-10 PROCEDURE — 58300 INSERT INTRAUTERINE DEVICE: CPT | Performed by: OBSTETRICS & GYNECOLOGY

## 2020-06-10 PROCEDURE — 99207 ZZC POST PARTUM EXAM: CPT | Performed by: OBSTETRICS & GYNECOLOGY

## 2020-06-10 ASSESSMENT — ANXIETY QUESTIONNAIRES
IF YOU CHECKED OFF ANY PROBLEMS ON THIS QUESTIONNAIRE, HOW DIFFICULT HAVE THESE PROBLEMS MADE IT FOR YOU TO DO YOUR WORK, TAKE CARE OF THINGS AT HOME, OR GET ALONG WITH OTHER PEOPLE: NOT DIFFICULT AT ALL
5. BEING SO RESTLESS THAT IT IS HARD TO SIT STILL: NOT AT ALL
7. FEELING AFRAID AS IF SOMETHING AWFUL MIGHT HAPPEN: NOT AT ALL
1. FEELING NERVOUS, ANXIOUS, OR ON EDGE: NOT AT ALL
6. BECOMING EASILY ANNOYED OR IRRITABLE: NOT AT ALL
2. NOT BEING ABLE TO STOP OR CONTROL WORRYING: NOT AT ALL
3. WORRYING TOO MUCH ABOUT DIFFERENT THINGS: NOT AT ALL
GAD7 TOTAL SCORE: 0

## 2020-06-10 ASSESSMENT — PATIENT HEALTH QUESTIONNAIRE - PHQ9
5. POOR APPETITE OR OVEREATING: NOT AT ALL
SUM OF ALL RESPONSES TO PHQ QUESTIONS 1-9: 0

## 2020-06-10 NOTE — PROGRESS NOTES
SUBJECTIVE:  Leandra Brush,  is here for a postpartum visit.  She had a  on 2020 delivering a healthy baby boy, named Nabeel weighing 8 lbs 8.2 oz at term.      HPI:  Patient is doing great. nabeel has been a really easy baby and sleeping reasonable well at night. Breastfeeding going well and puming usually just once a day but middle of the night. Sometimes she'll pump when baby is sleeping and then he'll wake up shortly thereafter so that's been tricky to manage but starting to figure it out  Healed really well this time and was back to normal within a week or so  Lochia was done by 4 weeks  Wants IUD  Last PHQ-9 score on record=   PHQ-9 SCORE 6/10/2020   PHQ-9 Total Score 0     SELIN-7 SCORE 5/3/2019 10/7/2019 6/10/2020   Total Score 0 0 0       Pap:   Lab Results   Component Value Date    PAP NIL -HPV 2019    PAP NIL 2016    PAP neg,  2013       Delivery complications:  No  Breast feeding:  Yes, pumps also  Bladder problems:  No  Bowel problems/hemorrhoids:  No  Episiotomy/laceration/incision healed? 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    OBJECTIVE:  Vitals: /70   Wt 97.5 kg (215 lb)   LMP 2019   Breastfeeding Yes   BMI 30.85 kg/m    BMI= Body mass index is 30.85 kg/m .  General - pleasant female in no acute distress.  Breast -  deferred  Abdomen - No incision  Pelvic - EG: normal adult female, BUS: within normal limits, Vagina: well rugated, no discharge, Cervix: no lesions or CMT, Uterus: firm, normal sized and nontender, anteverted in position. Adnexae: no masses or tenderness.  Rectovaginal - deferred.    ASSESSMENT:    ICD-10-CM    1. Routine postpartum follow-up  Z39.2    2. Encounter for insertion of intrauterine contraceptive device  Z30.430 levonorgestrel  (MIRENA) 20 MCG/24HR IUD     levonorgestrel (MIRENA) 20 MCG/24HR IUD 20 mcg     INSERTION INTRAUTERINE DEVICE       PLAN:  May resume normal activities without restrictions.  Pap smear was not done today and is utd for 4 more years    Full counseling was provided, and all questions were answered.   Return to clinic in one year for an annual visit.     mirena IUD placed as per note above    Megha Emanuel MD

## 2020-06-10 NOTE — PROGRESS NOTES
IUD Insertion:  CONSULT:    Is a pregnancy test required: No.  Was a consent obtained?  Yes    Subjective: Leandra Brush is a 35 year old  presents for IUD and desires Mirena type IUD.    Patient has been given the opportunity to ask questions about all forms of birth control, including all options appropriate for Leandra Brush. Discussed that no method of birth control, except abstinence is 100% effective against pregnancy or sexually transmitted infection.     Leandra Brush understands she may have the IUD removed at any time. IUD should be removed by a health care provider.    The entire insertion procedure was reviewed with the patient, including care after placement.    Patient's last menstrual period was 2019. Last sexual activity: prior to delicery. No allergy to betadine or shellfish. Patient declines STD screening  No results found for: HCG      /70   Wt 97.5 kg (215 lb)   LMP 2019   Breastfeeding Yes   BMI 30.85 kg/m      Pelvic Exam:   EG/BUS: normal genital architecture without lesions, erythema or abnormal secretions.   Vagina: moist, pink, rugae with physiologic discharge and secretions  Cervix: multiparous no lesions and pink, moist, closed, without lesion or CMT  Uterus: midposition, mobile, no pain  Adnexa: within normal limits and no masses, nodularity, tenderness    PROCEDURE NOTE: -- IUD Insertion    Reason for Insertion: contraception    No premedication  Under sterile technique, cervix was visualized with speculum and prepped with Betadine solution swab x 3. Tenaculum was placed for stability. The uterus was gently straightened and sounded to 8.5 cm. IUD prepared for placement, and IUD inserted according to 's instructions without difficulty or significant resitance, and deployed at the fundus. The strings were visualized and trimmed to 3.0 cm from the external os. Tenaculum was removed and hemostasis noted. Speculum removed.  Patient tolerated procedure  well.    Lot # [id number]  Exp: 5/2022  NDC: 22344-238-54    EBL: minimal    Complications: none    ASSESSMENT:     ICD-10-CM    1. Encounter for insertion of intrauterine contraceptive device  Z30.430         PLAN:    Given 's handouts, including when to have IUD removed, list of danger s/sx, side effects and follow up recommended. Encouraged condom use for prevention of STD. Back up contraception advised for 7 days if progestin method. Advised to call for any fever, for prolonged or severe pain or bleeding, abnormal vaginal discharge, or unable to palpate strings. She was advised to use pain medications (ibuprofen) as needed for mild to moderate pain. Advised to follow-up in clinic in 4-6 weeks for IUD string check if unable to find strings or as directed by provider.     Megha Emanuel MD

## 2020-06-11 ASSESSMENT — ANXIETY QUESTIONNAIRES: GAD7 TOTAL SCORE: 0

## 2020-06-13 PROBLEM — Z34.90 PREGNANCY: Status: RESOLVED | Noted: 2020-04-26 | Resolved: 2020-06-13

## 2020-12-27 ENCOUNTER — HEALTH MAINTENANCE LETTER (OUTPATIENT)
Age: 35
End: 2020-12-27

## 2021-03-01 NOTE — PROGRESS NOTES
Really doing well. Isn't feeling sick and never really did.  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 Return in 3 weeks and offer AFP  
on unit

## 2021-08-08 ENCOUNTER — HEALTH MAINTENANCE LETTER (OUTPATIENT)
Age: 36
End: 2021-08-08

## 2021-08-10 NOTE — PROGRESS NOTES
Leandra is a 36 year old  female who presents for annual exam.     Besides routine health maintenance, she has no other health concerns today.    HPI:  The patient's PCP is Dr. Megha Emanuel MD.      Doing overall well. Starting back to school with workshops and classroom prep next week. Their school is not mandating masks. Is in the HS so hoping many are vaccinated. Struggles herself with masking and lockdowns though definitely believes in vaxx.  is vaxxed but very much right leaning and feels like there's so many thing wrong about masking and govt control and so it leads to some disagreements with him and his family at times.  [place] and [employer] and she did great. Left [facility] with her parents. Now considerin a trip to [place] next spring break b/c gasper's parents got a place there and then thinking to do sean just with xander.     mirena in since  and going great. Was getting some degree of a monthly cycle but so light and getting even lighter. Mostly just when wiping and brownish. Doesn't even need a tampon or liner most of the time. No cramping and no pms and really happy with it.    No fasting labs for quite a while. Not fasting this afternoon. Would like to return and do them next year with her annual.    Patient's biological mom had colon cancer at age 46 but is A&W now. Her MGM biologically on that side also had it. Thinks it was around age 50 but now heard that she has a recurrence of it. Hasn't talked to her biological mom recently to know the details though. Wondering when she needs screening. With Gasper's crohn's they go to [facility] and would like to do screening there as well      GYNECOLOGIC HISTORY:    Patient's last menstrual period was 2021.    Regular menses? No, IUD.  Menses every 28 days  Length of menses: 4 days    Her current contraception method is: IUD.  She  reports that she has never smoked. She has never used smokeless  tobacco.    Patient is sexually active.  STD testing offered?  Declined    Last PHQ-9 score on record =   PHQ-9 SCORE 2021   PHQ-9 Total Score 0     Last GAD7 score on record =   SELIN-7 SCORE 2021   Total Score 0     Alcohol Score = 3    HEALTH MAINTENANCE:  Cholesterol: No results   Last Mammo: Not applicable, Next Mammo: Due at age 40   Pap:   Lab Results   Component Value Date    PAP NIL NEG-HPV 2019    PAP NIL 2016    PAP neg, -HPV 2013      Colonoscopy: N/A, Next Colonoscopy: Due at age 45.  Dexa:  N/A    Health maintenance updated:  yes    HISTORY:  OB History    Para Term  AB Living   2 2 2 0 0 2   SAB TAB Ectopic Multiple Live Births   0 0 0 0 2      # Outcome Date GA Lbr Wei/2nd Weight Sex Delivery Anes PTL Lv   2 Term 20 39w3d 04:03 / 00:10 3.86 kg (8 lb 8.2 oz) M Vag-Spont EPI N CELE      Birth Comments: followed and electively induced by aida. cervidil ripening o/n then AROM and pit to 8mu and rapid labor, pushed 3 times, small vaginal base tear w/o perineal extension repaired      Name: Iraida      Apgar1: 8  Apgar5: 9   1 Term 16 40w3d 05:30 / 01:58 3.59 kg (7 lb 14.6 oz) F Vag-Spont EPI N CELE      Birth Comments: followed and delivered by Aida      Name: Melina      Apgar1: 9  Apgar5: 9       Patient Active Problem List   Diagnosis     Family history of colon cancer     Presence of 52 mg levonorgestrel-releasing intrauterine device (IUD)     Past Surgical History:   Procedure Laterality Date     HC TOOTH EXTRACTION W/FORCEP      wisdom teeth extraction     tubes in ears        Social History     Tobacco Use     Smoking status: Never Smoker     Smokeless tobacco: Never Used   Substance Use Topics     Alcohol use: Not Currently     Alcohol/week: 0.0 standard drinks     Comment: Not while pregnant       Problem (# of Occurrences) Relation (Name,Age of Onset)    Colon Cancer (2) Maternal Grandmother (50), Mother (46)    No Known Problems (5)  "Father, Sister, Brother, Maternal Grandfather, Paternal Grandmother    Unknown/Adopted (1) Other            Current Outpatient Medications   Medication Sig     levonorgestrel (MIRENA) 20 MCG/24HR IUD 1 each (20 mcg) by Intrauterine route once Lot # [id number]  Exp: 5/2022  NDC: 92569-886-37     No current facility-administered medications for this visit.     Allergies   Allergen Reactions     Amoxicillin      Penicillins        Past medical, surgical, social and family histories were reviewed and updated in EPIC.    ROS:   12 point review of systems negative other than symptoms noted below or in the HPI.  No urinary frequency or dysuria, bladder or kidney problems    EXAM:  BP 94/60   Ht 1.778 m (5' 10\")   Wt 86.6 kg (191 lb)   LMP 07/20/2021   BMI 27.41 kg/m     BMI: Body mass index is 27.41 kg/m .    PHYSICAL EXAM:  Constitutional:   Appearance: Well nourished, well developed, alert, in no acute distress  Neck:  Lymph Nodes:  No lymphadenopathy present    Thyroid:  Gland size normal, nontender, no nodules or masses present  on palpation  Chest:  Respiratory Effort:  Breathing unlabored, CTA bilaterally  Cardiovascular:    Heart: Auscultation:  Regular rate, normal rhythm, no murmurs present  Breasts: Palpation of Breasts and Axillae:  No masses present on palpation, no breast tenderness., Axillary Lymph Nodes:  No lymphadenopathy present. and No nodularity, asymmetry or nipple discharge bilaterally.  Gastrointestinal:   Abdominal Examination:  Abdomen nontender to palpation, tone normal without rigidity or guarding, no masses present, umbilicus without lesions   Liver and Spleen:  No hepatomegaly present, liver nontender to palpation    Hernias:  No hernias present  Lymphatic: Lymph Nodes:  No other lymphadenopathy present  Skin:  General Inspection:  No rashes present, no lesions present, no areas of  discoloration  Neurologic:    Mental Status:  Oriented X3.  Normal strength and tone, sensory exam               "  grossly normal, mentation intact and speech normal.    Psychiatric:   Mentation appears normal and affect normal/bright.         Pelvic Exam:  External Genitalia:     Normal appearance for age, no discharge present, no tenderness present, no inflammatory lesions present, color normal  Vagina:    Normal vaginal vault without central or paravaginal defects, no discharge present, no inflammatory lesions present, no masses present  Bladder:     Nontender to palpation  Urethra:   Urethral Body:  Urethra palpation normal, urethra structural support normal   Urethral Meatus:  No erythema or lesions present  Cervix:     Appearance healthy, no lesions present, nontender to palpation, no bleeding present, string present  Uterus:     Nontender to palpation, no masses present, position anteflexed, mobility: normal  Adnexa:     No adnexal tenderness present, no adnexal masses present  Perineum:     Perineum within normal limits, no evidence of trauma, no rashes or skin lesions present  Anus:     Anus within normal limits, no hemorrhoids present  Inguinal Lymph Nodes:     No lymphadenopathy present  Pubic Hair:     Normal pubic hair distribution for age  Genitalia and Groin:     No rashes present, no lesions present, no areas of discoloration, no masses present      COUNSELING:   Reviewed preventive health counseling, as reflected in patient instructions  Special attention given to:        Regular exercise       Healthy diet/nutrition       Colon cancer screening    BMI: Body mass index is 27.41 kg/m .      ASSESSMENT:  36 year old female with satisfactory annual exam.    ICD-10-CM    1. Encounter for gynecological examination without abnormal finding  Z01.419    2. Family history of colon cancer  Z80.0 Adult Gastro Ref - Procedure Only   3. Presence of 52 mg levonorgestrel-releasing intrauterine device (IUD)  Z97.5        PLAN:  Pap is UTD for 3 more years  Fasting labs next year  UTD on vaccinations. 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    Referral will be faxed ot [place] as she is now 10 yrs younger than her biological mom's age at diagnosis. Have discussed genetic testing but doesn't feel her mom/mgm would do this. Will start with screening for her and could consider polyposis or just full myriad myrisk screening if she desires, or if something is found at time of c.s this year.    Megha Emanuel MD

## 2021-08-13 ENCOUNTER — OFFICE VISIT (OUTPATIENT)
Dept: OBGYN | Facility: CLINIC | Age: 36
End: 2021-08-13
Payer: COMMERCIAL

## 2021-08-13 VITALS
BODY MASS INDEX: 27.35 KG/M2 | DIASTOLIC BLOOD PRESSURE: 60 MMHG | HEIGHT: 70 IN | WEIGHT: 191 LBS | SYSTOLIC BLOOD PRESSURE: 94 MMHG

## 2021-08-13 DIAGNOSIS — Z97.5 PRESENCE OF 52 MG LEVONORGESTREL-RELEASING INTRAUTERINE DEVICE (IUD): ICD-10-CM

## 2021-08-13 DIAGNOSIS — Z01.419 ENCOUNTER FOR GYNECOLOGICAL EXAMINATION WITHOUT ABNORMAL FINDING: Primary | ICD-10-CM

## 2021-08-13 DIAGNOSIS — Z80.0 FAMILY HISTORY OF COLON CANCER: ICD-10-CM

## 2021-08-13 PROCEDURE — 99395 PREV VISIT EST AGE 18-39: CPT | Performed by: OBSTETRICS & GYNECOLOGY

## 2021-08-13 ASSESSMENT — ANXIETY QUESTIONNAIRES
7. FEELING AFRAID AS IF SOMETHING AWFUL MIGHT HAPPEN: NOT AT ALL
6. BECOMING EASILY ANNOYED OR IRRITABLE: NOT AT ALL
GAD7 TOTAL SCORE: 0
2. NOT BEING ABLE TO STOP OR CONTROL WORRYING: NOT AT ALL
1. FEELING NERVOUS, ANXIOUS, OR ON EDGE: NOT AT ALL
5. BEING SO RESTLESS THAT IT IS HARD TO SIT STILL: NOT AT ALL
3. WORRYING TOO MUCH ABOUT DIFFERENT THINGS: NOT AT ALL

## 2021-08-13 ASSESSMENT — MIFFLIN-ST. JEOR: SCORE: 1636.62

## 2021-08-13 ASSESSMENT — PATIENT HEALTH QUESTIONNAIRE - PHQ9
5. POOR APPETITE OR OVEREATING: NOT AT ALL
SUM OF ALL RESPONSES TO PHQ QUESTIONS 1-9: 0

## 2021-08-14 ASSESSMENT — ANXIETY QUESTIONNAIRES: GAD7 TOTAL SCORE: 0

## 2021-10-04 ENCOUNTER — HEALTH MAINTENANCE LETTER (OUTPATIENT)
Age: 36
End: 2021-10-04

## 2022-09-11 ENCOUNTER — HEALTH MAINTENANCE LETTER (OUTPATIENT)
Age: 37
End: 2022-09-11

## 2022-12-02 NOTE — PROGRESS NOTES
Leandra is a 37 year old  female who presents for annual exam.     Besides routine health maintenance, she has no other health concerns today .    HPI:  The patient's PCP is Dr. Megha Emanuel MD.      Patient is doing well overall. mirena has now been in for 2.5 yrs and has amenorrhea with it. No pain or cramping or any significant PMS at all    Not fasting today but hasn't had labs in quite a while so open to doing it today and repeating anything fasting if needed in the future since already here    B/c of fhx of colon cancer in her bio mom and mgm plan was to do c.s last year and referral sent to [facility] but patient states she never heard from them and got busy so just dropped it. Definitely open to doing it and another referral being sent    Patient is due for flu and covid boosters but declines them both    Kids are doing well. She is off of work today but was working anyway b/c was buying supplies and groceries for some projects and things that the kids will be doing in class even with the sub. Definitely working a lot and a lot of outside of normal hours. Still short staffed but feels less stressful than it did last year at least      GYNECOLOGIC HISTORY:    No LMP recorded (exact date). (Menstrual status: IUD).    Her current contraception method is: IUD.  She  reports that she has never smoked. She has never used smokeless tobacco.    Patient is sexually active.  STD testing offered?  Declined  Last PHQ-9 score on record =   PHQ-9 SCORE 2022   PHQ-9 Total Score 0     Last GAD7 score on record =   SELIN-7 SCORE 2022   Total Score 0     Alcohol Score = 3    HEALTH MAINTENANCE:  Cholesterol: (  Cholesterol   Date Value Ref Range Status   2022 149 <200 mg/dL Final    : unknown   Last Mammo: Not applicable, Result: Not applicable, Next Mammo: Due at age 40   Pap:  Lab Results   Component Value Date    PAP NIL, HPV NEG  2019    PAP NIL 2016    PAP neg, -HPV 2013      Colonoscopy:  NA, Result: Not applicable, Next Colonoscopy: 45 years.  Dexa:  NA    Health maintenance updated:  Declined covid and flu    HISTORY:  OB History    Para Term  AB Living   2 2 2 0 0 2   SAB IAB Ectopic Multiple Live Births   0 0 0 0 2      # Outcome Date GA Lbr Wei/2nd Weight Sex Delivery Anes PTL Lv   2 Term 20 39w3d 04:03 / 00:10 3.86 kg (8 lb 8.2 oz) M Vag-Spont EPI N CELE      Birth Comments: followed and electively induced by aida. cervidil ripening o/n then AROM and pit to 8mu and rapid labor, pushed 3 times, small vaginal base tear w/o perineal extension repaired      Name: Iraida      Apgar1: 8  Apgar5: 9   1 Term 16 40w3d 05:30 / 01:58 3.59 kg (7 lb 14.6 oz) F Vag-Spont EPI N CELE      Birth Comments: followed and delivered by Aida      Name: Melina      Apgar1: 9  Apgar5: 9       Patient Active Problem List   Diagnosis     Family history of colon cancer     Presence of 52 mg levonorgestrel-releasing intrauterine device (IUD)     Past Surgical History:   Procedure Laterality Date     HC TOOTH EXTRACTION W/FORCEP      wisdom teeth extraction     tubes in ears        Social History     Tobacco Use     Smoking status: Never     Smokeless tobacco: Never   Substance Use Topics     Alcohol use: Not Currently     Alcohol/week: 0.0 standard drinks     Comment: Not while pregnant       Problem (# of Occurrences) Relation (Name,Age of Onset)    Unknown/Adopted (1) Other    Colon Cancer (2) Maternal Grandmother (50), Mother (46)    No Known Problems (5) Father, Sister, Brother, Maternal Grandfather, Paternal Grandmother            Current Outpatient Medications   Medication Sig     clobetasol (TEMOVATE) 0.05 % external ointment Apply topically 2 times daily for 10 days     levonorgestrel (MIRENA) 20 MCG/24HR IUD 1 each (20 mcg) by Intrauterine route once Lot # [id number]  Exp: 2022  NDC: 33243-849-94     No current facility-administered medications for this visit.  "    Allergies   Allergen Reactions     Amoxicillin Anaphylaxis and Hives     Penicillins Anaphylaxis and Hives       Past medical, surgical, social and family histories were reviewed and updated in EPIC.    ROS:   12 point review of systems negative other than symptoms noted below or in the HPI.  No urinary frequency or dysuria, bladder or kidney problems    EXAM:  /60   Ht 1.778 m (5' 10\")   Wt 92.5 kg (204 lb)   LMP  (Exact Date)   Breastfeeding No   BMI 29.27 kg/m     BMI: Body mass index is 29.27 kg/m .    PHYSICAL EXAM:  Constitutional:   Appearance: Well nourished, well developed, alert, in no acute distress  Neck:  Lymph Nodes:  No lymphadenopathy present    Thyroid:  Gland size normal, nontender, no nodules or masses present  on palpation  Chest:  Respiratory Effort:  Breathing unlabored, cta bilaterally  Cardiovascular:    Heart: Auscultation:  Regular rate, normal rhythm, no murmurs present  Breasts: Palpation of Breasts and Axillae:  No masses present on palpation, no breast tenderness., Axillary Lymph Nodes:  No lymphadenopathy present. and No nodularity, asymmetry or nipple discharge bilaterally.  Gastrointestinal:   Abdominal Examination:  Abdomen nontender to palpation, tone normal without rigidity or guarding, no masses present, umbilicus without lesions   Liver and Spleen:  No hepatomegaly present, liver nontender to palpation    Hernias:  No hernias present  Lymphatic: Lymph Nodes:  No other lymphadenopathy present  Skin:  General Inspection:  No rashes present, no lesions present, no areas of  discoloration  Neurologic:    Mental Status:  Oriented X3.  Normal strength and tone, sensory exam                grossly normal, mentation intact and speech normal.    Psychiatric:   Mentation appears normal and affect normal/bright.         Pelvic Exam:  External Genitalia:     Normal appearance for age, no discharge present, no tenderness present, no inflammatory lesions present, AT THE LOWER " LABIA MAJORA AND MOSTLY AT THE PERINEUM AND SUPERIOR PERIANAL AREA THERE IS CLEAR L.S WITH WHITENING AND ON THE LEFT ESPECIALLY A COUPLE FISSURES  Vagina:    Normal vaginal vault without central or paravaginal defects, no discharge present, no inflammatory lesions present, no masses present  Bladder:     Nontender to palpation  Urethra:   Urethral Body:  Urethra palpation normal, urethra structural support normal   Urethral Meatus:  No erythema or lesions present  Cervix:     Appearance healthy, no lesions present, nontender to palpation, no bleeding present, string present  Uterus:     Nontender to palpation, no masses present, position anteflexed, mobility: normal  Adnexa:     No adnexal tenderness present, no adnexal masses present  Perineum:     Perineum within normal limits, no evidence of trauma, no rashes or skin lesions present  Anus:     Anus within normal limits, no hemorrhoids present  Inguinal Lymph Nodes:     No lymphadenopathy present  Pubic Hair:     Normal pubic hair distribution for age  Genitalia and Groin:     No rashes present, no lesions present, no areas of discoloration, no masses present      COUNSELING:   Reviewed preventive health counseling, as reflected in patient instructions  Special attention given to:        Immunizations    Declined: Covid-19 and Influenza due to Conscientious objector               Contraception       Colorectal Cancer Screening    BMI: Body mass index is 29.27 kg/m .  Weight management plan: Discussed healthy diet and exercise guidelines    ASSESSMENT:  37 year old female with satisfactory annual exam.    ICD-10-CM    1. Encounter for gynecological examination without abnormal finding  Z01.419       2. Lichen sclerosus et atrophicus of the vulva  N90.4 clobetasol (TEMOVATE) 0.05 % external ointment      3. Presence of 52 mg levonorgestrel-releasing intrauterine device (IUD)  Z97.5       4. Screening for thyroid disorder  Z13.29 TSH with free T4 reflex     TSH with  free T4 reflex      5. Screening for metabolic disorder  Z13.228 Comprehensive metabolic panel     CBC with platelets     Comprehensive metabolic panel     CBC with platelets      6. Encounter for lipid screening for cardiovascular disease  Z13.220 Lipid panel reflex to direct LDL Fasting    Z13.6 Lipid panel reflex to direct LDL Fasting      7. Need for hepatitis C screening test  Z11.59 Hepatitis C antibody     Hepatitis C antibody      8. Screening for HIV without presence of risk factors  Z11.4 HIV Antigen Antibody Combo      9. Screening for diabetes mellitus  Z13.1 Hemoglobin A1c     Hemoglobin A1c      10. Encounter for vitamin deficiency screening  Z13.21 Vitamin D Deficiency     Vitamin D Deficiency      11. Family history of colon cancer  Z80.0 Colonoscopy Screening  Referral          PLAN:  Pap is UTD for another 1.5 yrs  mammo to start in 3 yrs  Will resend a colonoscopy referral to [facility] as first degree relative with colon cancer by age 47 so she is now at the 10 yrs younger asa for that    Screening labs today and if anything needs fasting repeat can return for that.   Will add the cdc recommended hiv and hep c as well    Reviewed 8 yrs of FDA approval on her mirena now and she is very happy with it and has amenorrhea    Patient was not aware that she had any vulvar dermatologic issues. Once L.S found and asked about it she states she would sometimes have some mild itching and dryness but would resolve and forget about it and just thought it was chaffing or irritation or friction  Discussed the diagnosis and the cause, flare/remission expectations, treatment with steroid and then determining if needs prophylaxis 2-3x/week to keep from frequent flares or if doing at bedtime for 7 days with flares that are infrequent is adequate  Advised that after BID for 10 day initial treatment to use frequent and regular vaseline topically to help as a skin barrier to friction and discharge and  triggers that can lead to flares of the L.S to minimize them.    On the same DOS, an additional 10 minutes on top of the preventative annual gyn exam, were spent on direct management of the patient's medical issues as above as well as chart review including: imaging, lab work, previous visit notes by this provider,  and other provider notes, and chart completion       Megha Emanuel MD

## 2022-12-03 ENCOUNTER — HEALTH MAINTENANCE LETTER (OUTPATIENT)
Age: 37
End: 2022-12-03

## 2022-12-05 ENCOUNTER — OFFICE VISIT (OUTPATIENT)
Dept: OBGYN | Facility: CLINIC | Age: 37
End: 2022-12-05
Payer: COMMERCIAL

## 2022-12-05 VITALS
BODY MASS INDEX: 29.2 KG/M2 | HEIGHT: 70 IN | SYSTOLIC BLOOD PRESSURE: 100 MMHG | DIASTOLIC BLOOD PRESSURE: 60 MMHG | WEIGHT: 204 LBS

## 2022-12-05 DIAGNOSIS — Z11.4 SCREENING FOR HIV WITHOUT PRESENCE OF RISK FACTORS: ICD-10-CM

## 2022-12-05 DIAGNOSIS — Z13.228 SCREENING FOR METABOLIC DISORDER: ICD-10-CM

## 2022-12-05 DIAGNOSIS — Z13.29 SCREENING FOR THYROID DISORDER: ICD-10-CM

## 2022-12-05 DIAGNOSIS — N90.4 LICHEN SCLEROSUS ET ATROPHICUS OF THE VULVA: ICD-10-CM

## 2022-12-05 DIAGNOSIS — Z11.59 NEED FOR HEPATITIS C SCREENING TEST: ICD-10-CM

## 2022-12-05 DIAGNOSIS — Z01.419 ENCOUNTER FOR GYNECOLOGICAL EXAMINATION WITHOUT ABNORMAL FINDING: Primary | ICD-10-CM

## 2022-12-05 DIAGNOSIS — Z13.21 ENCOUNTER FOR VITAMIN DEFICIENCY SCREENING: ICD-10-CM

## 2022-12-05 DIAGNOSIS — Z80.0 FAMILY HISTORY OF COLON CANCER: ICD-10-CM

## 2022-12-05 DIAGNOSIS — Z97.5 PRESENCE OF 52 MG LEVONORGESTREL-RELEASING INTRAUTERINE DEVICE (IUD): ICD-10-CM

## 2022-12-05 DIAGNOSIS — Z13.220 ENCOUNTER FOR LIPID SCREENING FOR CARDIOVASCULAR DISEASE: ICD-10-CM

## 2022-12-05 DIAGNOSIS — Z13.1 SCREENING FOR DIABETES MELLITUS: ICD-10-CM

## 2022-12-05 DIAGNOSIS — Z13.6 ENCOUNTER FOR LIPID SCREENING FOR CARDIOVASCULAR DISEASE: ICD-10-CM

## 2022-12-05 LAB
CHOLEST SERPL-MCNC: 149 MG/DL
ERYTHROCYTE [DISTWIDTH] IN BLOOD BY AUTOMATED COUNT: 13.4 % (ref 10–15)
HBA1C MFR BLD: 5.5 % (ref 0–5.6)
HCT VFR BLD AUTO: 40.2 % (ref 35–47)
HDLC SERPL-MCNC: 45 MG/DL
HGB BLD-MCNC: 13 G/DL (ref 11.7–15.7)
LDLC SERPL CALC-MCNC: 72 MG/DL
MCH RBC QN AUTO: 29.4 PG (ref 26.5–33)
MCHC RBC AUTO-ENTMCNC: 32.3 G/DL (ref 31.5–36.5)
MCV RBC AUTO: 91 FL (ref 78–100)
NONHDLC SERPL-MCNC: 104 MG/DL
PLATELET # BLD AUTO: 258 10E3/UL (ref 150–450)
RBC # BLD AUTO: 4.42 10E6/UL (ref 3.8–5.2)
TRIGL SERPL-MCNC: 158 MG/DL
TSH SERPL DL<=0.005 MIU/L-ACNC: 2.25 UIU/ML (ref 0.3–4.2)
WBC # BLD AUTO: 10.7 10E3/UL (ref 4–11)

## 2022-12-05 PROCEDURE — 80053 COMPREHEN METABOLIC PANEL: CPT | Performed by: OBSTETRICS & GYNECOLOGY

## 2022-12-05 PROCEDURE — 85027 COMPLETE CBC AUTOMATED: CPT | Performed by: OBSTETRICS & GYNECOLOGY

## 2022-12-05 PROCEDURE — 82306 VITAMIN D 25 HYDROXY: CPT | Performed by: OBSTETRICS & GYNECOLOGY

## 2022-12-05 PROCEDURE — 84443 ASSAY THYROID STIM HORMONE: CPT | Performed by: OBSTETRICS & GYNECOLOGY

## 2022-12-05 PROCEDURE — 80061 LIPID PANEL: CPT | Performed by: OBSTETRICS & GYNECOLOGY

## 2022-12-05 PROCEDURE — 99395 PREV VISIT EST AGE 18-39: CPT | Performed by: OBSTETRICS & GYNECOLOGY

## 2022-12-05 PROCEDURE — 87389 HIV-1 AG W/HIV-1&-2 AB AG IA: CPT | Performed by: OBSTETRICS & GYNECOLOGY

## 2022-12-05 PROCEDURE — 36415 COLL VENOUS BLD VENIPUNCTURE: CPT | Performed by: OBSTETRICS & GYNECOLOGY

## 2022-12-05 PROCEDURE — 83036 HEMOGLOBIN GLYCOSYLATED A1C: CPT | Performed by: OBSTETRICS & GYNECOLOGY

## 2022-12-05 PROCEDURE — 86803 HEPATITIS C AB TEST: CPT | Performed by: OBSTETRICS & GYNECOLOGY

## 2022-12-05 RX ORDER — CLOBETASOL PROPIONATE 0.5 MG/G
OINTMENT TOPICAL 2 TIMES DAILY
Qty: 60 G | Refills: 1 | Status: SHIPPED | OUTPATIENT
Start: 2022-12-05 | End: 2022-12-15

## 2022-12-05 ASSESSMENT — ANXIETY QUESTIONNAIRES
6. BECOMING EASILY ANNOYED OR IRRITABLE: NOT AT ALL
GAD7 TOTAL SCORE: 0
IF YOU CHECKED OFF ANY PROBLEMS ON THIS QUESTIONNAIRE, HOW DIFFICULT HAVE THESE PROBLEMS MADE IT FOR YOU TO DO YOUR WORK, TAKE CARE OF THINGS AT HOME, OR GET ALONG WITH OTHER PEOPLE: NOT DIFFICULT AT ALL
1. FEELING NERVOUS, ANXIOUS, OR ON EDGE: NOT AT ALL
7. FEELING AFRAID AS IF SOMETHING AWFUL MIGHT HAPPEN: NOT AT ALL
3. WORRYING TOO MUCH ABOUT DIFFERENT THINGS: NOT AT ALL
2. NOT BEING ABLE TO STOP OR CONTROL WORRYING: NOT AT ALL
GAD7 TOTAL SCORE: 0
5. BEING SO RESTLESS THAT IT IS HARD TO SIT STILL: NOT AT ALL

## 2022-12-05 ASSESSMENT — PATIENT HEALTH QUESTIONNAIRE - PHQ9
5. POOR APPETITE OR OVEREATING: NOT AT ALL
SUM OF ALL RESPONSES TO PHQ QUESTIONS 1-9: 0

## 2022-12-06 LAB
ALBUMIN SERPL BCG-MCNC: 4.2 G/DL (ref 3.5–5.2)
ALP SERPL-CCNC: 66 U/L (ref 35–104)
ALT SERPL W P-5'-P-CCNC: 13 U/L (ref 10–35)
ANION GAP SERPL CALCULATED.3IONS-SCNC: 14 MMOL/L (ref 7–15)
AST SERPL W P-5'-P-CCNC: 24 U/L (ref 10–35)
BILIRUB SERPL-MCNC: 0.4 MG/DL
BUN SERPL-MCNC: 14.4 MG/DL (ref 6–20)
CALCIUM SERPL-MCNC: 9.4 MG/DL (ref 8.6–10)
CHLORIDE SERPL-SCNC: 105 MMOL/L (ref 98–107)
CREAT SERPL-MCNC: 0.98 MG/DL (ref 0.51–0.95)
DEPRECATED CALCIDIOL+CALCIFEROL SERPL-MC: 23 UG/L (ref 20–75)
DEPRECATED HCO3 PLAS-SCNC: 22 MMOL/L (ref 22–29)
GFR SERPL CREATININE-BSD FRML MDRD: 76 ML/MIN/1.73M2
GLUCOSE SERPL-MCNC: 79 MG/DL (ref 70–99)
HCV AB SERPL QL IA: NONREACTIVE
HIV 1+2 AB+HIV1 P24 AG SERPL QL IA: NONREACTIVE
POTASSIUM SERPL-SCNC: 4.2 MMOL/L (ref 3.4–5.3)
PROT SERPL-MCNC: 6.8 G/DL (ref 6.4–8.3)
SODIUM SERPL-SCNC: 141 MMOL/L (ref 136–145)

## 2022-12-08 PROBLEM — N90.4 LICHEN SCLEROSUS ET ATROPHICUS OF THE VULVA: Status: ACTIVE | Noted: 2022-12-08

## 2022-12-08 NOTE — RESULT ENCOUNTER NOTE
Leandra,    Your cholesterol is fine. The HDL or good cholesterol is a little low but this is typically a genetic thing and exercising regularly is the best way to improve it. The triglycerides are more of a recent high carb/high fatty food intake issue, and can normalize fairly quickly with just even a few days of clean eating. Your trigs are slightly high but really nothing concerning.    Your metabolic panel is showing that your creatinine or one of your kidney function tests is elevated, meaning your kidneys aren't working ideally. However this normal range has always gone to 1.05 and just with your's right now, Im seeing that they changed their normal range for people to 0.95. Essentially anything under 1.0 is normal to me so 0.98 seems fine and not concerning. The GFR is another kidney test and it's perfect so I think their new normal range reset is causing this to flag but it's fine. We can repeat it next year to be sure.    Your A1C as a test of diabetes, your thyroid and your complete blood count is great.    Your Vitamin D is fairly low at 23 with an ideal range of 40-50. I would get over the counter vitamin D3, 2000 international unit(s) and plan to take one a day, every day, to get levels up to the optimal range.    Per the CDC we need to test one time in adulthood for HIV and Hepatitis C, so we did that and both are negative/normal, as expected.    Megha Emanuel MD

## 2023-03-10 ENCOUNTER — TRANSFERRED RECORDS (OUTPATIENT)
Dept: HEALTH INFORMATION MANAGEMENT | Facility: CLINIC | Age: 38
End: 2023-03-10

## 2023-11-06 ENCOUNTER — PATIENT OUTREACH (OUTPATIENT)
Dept: CARE COORDINATION | Facility: CLINIC | Age: 38
End: 2023-11-06

## 2023-11-20 ENCOUNTER — PATIENT OUTREACH (OUTPATIENT)
Dept: CARE COORDINATION | Facility: CLINIC | Age: 38
End: 2023-11-20

## 2024-01-13 ENCOUNTER — HEALTH MAINTENANCE LETTER (OUTPATIENT)
Age: 39
End: 2024-01-13

## 2024-02-01 ENCOUNTER — TRANSFERRED RECORDS (OUTPATIENT)
Dept: HEALTH INFORMATION MANAGEMENT | Facility: CLINIC | Age: 39
End: 2024-02-01
Payer: COMMERCIAL

## 2024-02-01 NOTE — PROGRESS NOTES
Leandra is a 38 year old  female who presents for annual exam.     Besides routine health maintenance, she has no other health concerns today .    HPI:  The patient's PCP is Dr. Megha Emanuel MD.  No PCP     Patient presents for annual visit.    Patient is doing well overall. No pain or cramping or any significant PMS at all. Had a light period a couple months ago, otherwise she rarely gets periods or even much spotting with her mirena IUD  placed .    Reported a recent/intermittent sensation of feeling like a wire bra poking her quick, sharp pain, although she no longer wear wire bras. It is in the lateral right outer breast just next to chest wall. Has not linked it occurring in any sort of cyclic fashion like it could be PMS but also with nearly no periods isn't really paying attention to that. Is not noticing it related to any exercise, lifting, different physical activity. Will happen at rest and shoot for a couple seconds or minutes on/off and then stop completely.     Patient will be due for her first screening mammo .  Due to her biological mom having had colon cancer at age 46 and her MGM at age 50 she already  had colonoscopy and it was normal.      Believes she had gardisil vax previously in high school or college  Unsure if she's had Hep B vax, discussed checking antibodies at next appt labs.  Has routine fasting labs  and all were normal and not fasting for repeat today    Patient has a h.o lichen sclerosus and was rx clobetasol . Used it for one course of treatment and really hasn't had any sx since then     Just celebrated Melina's 8th birthday, love sports, specifically softball. 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  Daughter and  are trying to plan [email or web address] course in [facility].  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      GYNECOLOGIC HISTORY:    No LMP recorded. (Menstrual status: IUD).    Her current contraception method is: IUD.  She  reports that  she has never smoked. She has never used smokeless tobacco.    Patient is sexually active.  STD testing offered?  Declined  Last PHQ-9 score on record =       2024    11:06 AM   PHQ-9 SCORE   PHQ-9 Total Score 0     Last GAD7 score on record =       2024    11:06 AM   SELIN-7 SCORE   Total Score 0     Alcohol Score = 3    HEALTH MAINTENANCE:  Cholesterol:   Recent Labs   Lab Test 22  1129   CHOL 149   HDL 45*   LDL 72   TRIG 158*     Last Mammo: Not applicable, Result: Not applicable, Next Mammo: Due at age 40   Pap:   Lab Results   Component Value Date    PAP NIL 2019    PAP NIL 2016    PAP neg, -HPV 2013      Colonoscopy:  03/10/2023, Result: polyp, Next Colonoscopy: 3 years.  Dexa:  NA    Health maintenance updated:  Yes     HISTORY:  OB History    Para Term  AB Living   2 2 2 0 0 2   SAB IAB Ectopic Multiple Live Births   0 0 0 0 2      # Outcome Date GA Lbr Wei/2nd Weight Sex Delivery Anes PTL Lv   2 Term 20 39w3d 04:03 / 00:10 3.86 kg (8 lb 8.2 oz) M Vag-Spont EPI N CELE      Birth Comments: followed and electively induced by aida. cervidil ripening o/n then AROM and pit to 8mu and rapid labor, pushed 3 times, small vaginal base tear w/o perineal extension repaired      Name: Iraida      Apgar1: 8  Apgar5: 9   1 Term 16 40w3d 05:30 / 01:58 3.59 kg (7 lb 14.6 oz) F Vag-Spont EPI N CELE      Birth Comments: followed and delivered by Aida      Name: Melina      Apgar1: 9  Apgar5: 9       Patient Active Problem List   Diagnosis    Family history of colon cancer    Presence of 52 mg levonorgestrel-releasing intrauterine device (IUD)    Lichen sclerosus et atrophicus of the vulva     Past Surgical History:   Procedure Laterality Date    HC TOOTH EXTRACTION W/FORCEP      wisdom teeth extraction    tubes in ears        Social History     Tobacco Use    Smoking status: Never    Smokeless tobacco: Never   Substance Use Topics    Alcohol use: Not Currently     " Alcohol/week: 0.0 standard drinks of alcohol     Comment: Not while pregnant       Problem (# of Occurrences) Relation (Name,Age of Onset)    Unknown/Adopted (1) Other    Colon Cancer (2) Maternal Grandmother (50), Mother (46)    No Known Problems (5) Father, Sister, Brother, Maternal Grandfather, Paternal Grandmother              Current Outpatient Medications   Medication Sig    levonorgestrel (MIRENA) 20 MCG/24HR IUD 1 each (20 mcg) by Intrauterine route once Lot # [id number]  Exp: 5/2022  NDC: 89618-171-99     No current facility-administered medications for this visit.     Allergies   Allergen Reactions    Amoxicillin Anaphylaxis and Hives    Penicillins Anaphylaxis and Hives       Past medical, surgical, social and family histories were reviewed and updated in EPIC.    EXAM:  BP 90/68   Ht 1.778 m (5' 10\")   Wt 94.9 kg (209 lb 3.2 oz)   Breastfeeding No   BMI 30.02 kg/m     BMI: Body mass index is 30.02 kg/m .    PHYSICAL EXAM:  Constitutional:   Appearance: Well nourished, well developed, alert, in no acute distress  Neck:  Lymph Nodes:  No lymphadenopathy present    Thyroid:  Gland size normal, nontender, no nodules or masses present  on palpation  Chest:  Respiratory Effort:  Breathing unlabored, CTA B  Cardiovascular:    Heart: Auscultation:  Regular rate, normal rhythm, no murmurs present  Breasts: Inspection of Breasts:  No lymphadenopathy present., Palpation of Breasts and Axillae:  No masses present on palpation, no breast tenderness., Axillary Lymph Nodes:  No lymphadenopathy present., and No nodularity, asymmetry or nipple discharge bilaterally. Diffuse f.c breast tissue particularly in the upper outer quadrants bilaterally. Nothing specifically abnormal or different between right and left. She is not tender to palpation in the right lateral breast where she gets the sensation of pain but there is a more broad band of fibrous tissue in that location  Gastrointestinal:   Abdominal Examination:  " Abdomen nontender to palpation, tone normal without rigidity or guarding, no masses present, umbilicus without lesions   Liver and Spleen:  No hepatomegaly present, liver nontender to palpation    Hernias:  No hernias present  Lymphatic: Lymph Nodes:  No other lymphadenopathy present  Skin:  General Inspection:  No rashes present, no lesions present, no areas of  discoloration  Neurologic:    Mental Status:  Oriented X3.  Normal strength and tone, sensory exam                grossly normal, mentation intact and speech normal.    Psychiatric:   Mentation appears normal and affect normal/bright.         Pelvic Exam:  External Genitalia:     Normal appearance for age, no discharge present, no tenderness present, no inflammatory lesions present, color normal  Vagina:    Normal vaginal vault without central or paravaginal defects, no discharge present, no inflammatory lesions present, no masses present  Bladder:     Nontender to palpation  Urethra:   Urethral Body:  Urethra palpation normal, urethra structural support normal   Urethral Meatus:  No erythema or lesions present  Cervix:     Appearance healthy, no lesions present, nontender to palpation, no bleeding present, IUD STRING PRESENT   Uterus:     Nontender to palpation, no masses present, position anteflexed, mobility: normal  Adnexa:     No adnexal tenderness present, no adnexal masses present  Perineum:     Perineum within normal limits, no evidence of trauma, no rashes or skin lesions present  Anus:     Anus within normal limits, no hemorrhoids present  Inguinal Lymph Nodes:     No lymphadenopathy present  Pubic Hair:     Normal pubic hair distribution for age  Genitalia and Groin:     No rashes present, no lesions present, no areas of discoloration, no masses present    COUNSELING:   Reviewed preventive health counseling, as reflected in patient instructions       Regular exercise       Healthy diet/nutrition       Immunizations  Declined: Covid-19 and  Influenza due to Concerns about side effects/safety        BMI: Body mass index is 30.02 kg/m .  Weight management plan: Discussed healthy diet and exercise guidelines    ASSESSMENT:  38 year old female with satisfactory annual exam.    ICD-10-CM    1. Encounter for gynecological examination without abnormal finding  Z01.419 Pap thin layer screen with HPV - recommended age 30 - 65 years     HPV Hold (Lab Only)     HPV High Risk Types DNA Cervical      2. Presence of 52 mg levonorgestrel-releasing intrauterine device (IUD)  Z97.5       3. Lichen sclerosus et atrophicus of the vulva  N90.4           PLAN:    Pap today  Can follow routine ASCCP guidelines     Discussed that she is due for mammo next year in may. 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 Reviewed FDA updated recs that good for use for up to 8 yrs now    No evidence of significant L.S and still has clobetasol at home if should get a flare. If does need a refill prior to next year, can contact me for that.     Fasting labs UTD and can repeat every 3-4 yrs or so. When does them next would advise Hep B antibody screen to assess for prior vaxx or not    Megha Emanuel MD

## 2024-02-02 ENCOUNTER — OFFICE VISIT (OUTPATIENT)
Dept: OBGYN | Facility: CLINIC | Age: 39
End: 2024-02-02
Payer: COMMERCIAL

## 2024-02-02 VITALS
DIASTOLIC BLOOD PRESSURE: 68 MMHG | BODY MASS INDEX: 29.95 KG/M2 | WEIGHT: 209.2 LBS | SYSTOLIC BLOOD PRESSURE: 90 MMHG | HEIGHT: 70 IN

## 2024-02-02 DIAGNOSIS — N90.4 LICHEN SCLEROSUS ET ATROPHICUS OF THE VULVA: ICD-10-CM

## 2024-02-02 DIAGNOSIS — Z01.419 ENCOUNTER FOR GYNECOLOGICAL EXAMINATION WITHOUT ABNORMAL FINDING: Primary | ICD-10-CM

## 2024-02-02 DIAGNOSIS — Z97.5 PRESENCE OF 52 MG LEVONORGESTREL-RELEASING INTRAUTERINE DEVICE (IUD): ICD-10-CM

## 2024-02-02 PROCEDURE — G0145 SCR C/V CYTO,THINLAYER,RESCR: HCPCS | Performed by: OBSTETRICS & GYNECOLOGY

## 2024-02-02 PROCEDURE — 99395 PREV VISIT EST AGE 18-39: CPT | Performed by: OBSTETRICS & GYNECOLOGY

## 2024-02-02 PROCEDURE — 87624 HPV HI-RISK TYP POOLED RSLT: CPT | Performed by: OBSTETRICS & GYNECOLOGY

## 2024-02-02 ASSESSMENT — ANXIETY QUESTIONNAIRES
GAD7 TOTAL SCORE: 0
GAD7 TOTAL SCORE: 0
6. BECOMING EASILY ANNOYED OR IRRITABLE: NOT AT ALL
6. BECOMING EASILY ANNOYED OR IRRITABLE: NOT AT ALL
1. FEELING NERVOUS, ANXIOUS, OR ON EDGE: NOT AT ALL
7. FEELING AFRAID AS IF SOMETHING AWFUL MIGHT HAPPEN: NOT AT ALL
5. BEING SO RESTLESS THAT IT IS HARD TO SIT STILL: NOT AT ALL
2. NOT BEING ABLE TO STOP OR CONTROL WORRYING: NOT AT ALL
IF YOU CHECKED OFF ANY PROBLEMS ON THIS QUESTIONNAIRE, HOW DIFFICULT HAVE THESE PROBLEMS MADE IT FOR YOU TO DO YOUR WORK, TAKE CARE OF THINGS AT HOME, OR GET ALONG WITH OTHER PEOPLE: NOT DIFFICULT AT ALL
1. FEELING NERVOUS, ANXIOUS, OR ON EDGE: NOT AT ALL
5. BEING SO RESTLESS THAT IT IS HARD TO SIT STILL: NOT AT ALL
2. NOT BEING ABLE TO STOP OR CONTROL WORRYING: NOT AT ALL
IF YOU CHECKED OFF ANY PROBLEMS ON THIS QUESTIONNAIRE, HOW DIFFICULT HAVE THESE PROBLEMS MADE IT FOR YOU TO DO YOUR WORK, TAKE CARE OF THINGS AT HOME, OR GET ALONG WITH OTHER PEOPLE: NOT DIFFICULT AT ALL
7. FEELING AFRAID AS IF SOMETHING AWFUL MIGHT HAPPEN: NOT AT ALL
GAD7 TOTAL SCORE: 0
3. WORRYING TOO MUCH ABOUT DIFFERENT THINGS: NOT AT ALL
3. WORRYING TOO MUCH ABOUT DIFFERENT THINGS: NOT AT ALL

## 2024-02-02 ASSESSMENT — PATIENT HEALTH QUESTIONNAIRE - PHQ9
SUM OF ALL RESPONSES TO PHQ QUESTIONS 1-9: 0
5. POOR APPETITE OR OVEREATING: NOT AT ALL
5. POOR APPETITE OR OVEREATING: NOT AT ALL

## 2024-02-07 LAB
BKR LAB AP GYN ADEQUACY: NORMAL
BKR LAB AP GYN INTERPRETATION: NORMAL
BKR LAB AP HPV REFLEX: NORMAL
BKR LAB AP PREVIOUS ABNORMAL: NORMAL
PATH REPORT.COMMENTS IMP SPEC: NORMAL
PATH REPORT.COMMENTS IMP SPEC: NORMAL
PATH REPORT.RELEVANT HX SPEC: NORMAL

## 2024-02-09 LAB
HUMAN PAPILLOMA VIRUS 16 DNA: NEGATIVE
HUMAN PAPILLOMA VIRUS 18 DNA: NEGATIVE
HUMAN PAPILLOMA VIRUS FINAL DIAGNOSIS: NORMAL
HUMAN PAPILLOMA VIRUS OTHER HR: NEGATIVE

## 2025-01-08 ENCOUNTER — TRANSFERRED RECORDS (OUTPATIENT)
Dept: HEALTH INFORMATION MANAGEMENT | Facility: CLINIC | Age: 40
End: 2025-01-08
Payer: COMMERCIAL

## 2025-03-22 ENCOUNTER — HEALTH MAINTENANCE LETTER (OUTPATIENT)
Age: 40
End: 2025-03-22

## 2025-05-07 ENCOUNTER — PATIENT OUTREACH (OUTPATIENT)
Dept: CARE COORDINATION | Facility: CLINIC | Age: 40
End: 2025-05-07
Payer: COMMERCIAL

## 2025-05-24 ENCOUNTER — HEALTH MAINTENANCE LETTER (OUTPATIENT)
Age: 40
End: 2025-05-24

## 2025-06-04 NOTE — PROGRESS NOTES
Leandra is a 40 year old  female who presents for annual exam.     Besides routine health maintenance, she has no other health concerns today .    HPI:  The patient's PCP is Dr. Megha Emanuel MD.    Patient is here for her annual exam and overall is doing well.     Really biggest complaint is inability to lose weight.  About the same at 208# or BMI of 29 for the last 2-3 yrs but has really been focused on diet and exercise and despite that isn't losing at all which is frustrating. Is more tired but not having any v.m sx or issues.    Had L.S in the past and needed clobetasol but hasn't had any significant flares since end of /early     Has had her mirena since  and has some occasional light spotting that is mostly short lived. Rarely lingers for more than a couple of days and rarely is more pink to red. Never heavy though and overall happy with it.    B/c of her bio mom and MGM's early age of colon cancer, age 46 and 50 respectively, she has started her c.s and had it 3/23. Found a polyp and divertic so rec is to repeat 3/26    Has her first mammo today      GYNECOLOGIC HISTORY:    No LMP recorded. (Menstrual status: IUD).    Regular menses? No, cycles every now and then.     Her current contraception method is: IUD.  She  reports that she has never smoked. She has never used smokeless tobacco.    Patient is sexually active.  STD testing offered?  Declined  Last PHQ-9 score on record =       2024    11:06 AM   PHQ-9 SCORE   PHQ-9 Total Score 0     Last GAD7 score on record =       2024    11:06 AM   SELIN-7 SCORE   Total Score 0     Alcohol Score =     HEALTH MAINTENANCE:  Cholesterol:   Recent Labs   Lab Test 22  1129   CHOL 149   HDL 45*   LDL 72   TRIG 158*     Last Mammo: Not applicable, Result: Not applicable, Next Mammo: Today     Pap:   Lab Results   Component Value Date    GYNINTERP  2024     Negative for Intraepithelial Lesion or Malignancy (NILM)    PAP NIL 2019     PAP NIL 2016    PAP neg, -HPV 2013     Colonoscopy:  3/10/2023, Result: polyps, Next Colonoscopy: 3 years.  Dexa:  NA    Health maintenance updated:  mammo    HISTORY:  OB History    Para Term  AB Living   2 2 2 0 0 2   SAB IAB Ectopic Multiple Live Births   0 0 0 0 2      # Outcome Date GA Lbr Wei/2nd Weight Sex Type Anes PTL Lv   2 Term 20 39w3d 04:03 / 00:10 3.86 kg (8 lb 8.2 oz) M Vag-Spont EPI N CELE      Birth Comments: followed and electively induced by aida. cervidil ripening o/n then AROM and pit to 8mu and rapid labor, pushed 3 times, small vaginal base tear w/o perineal extension repaired      Name: Iraida      Apgar1: 8  Apgar5: 9   1 Term 16 40w3d 05:30 / 01:58 3.59 kg (7 lb 14.6 oz) F Vag-Spont EPI N CELE      Birth Comments: followed and delivered by Aida      Name: Melina      Apgar1: 9  Apgar5: 9       Patient Active Problem List   Diagnosis    Family history of colon cancer    Presence of 52 mg levonorgestrel-releasing intrauterine device (IUD)    Lichen sclerosus et atrophicus of the vulva     Past Surgical History:   Procedure Laterality Date    HC TOOTH EXTRACTION W/FORCEP      wisdom teeth extraction    tubes in ears        Social History     Tobacco Use    Smoking status: Never    Smokeless tobacco: Never   Substance Use Topics    Alcohol use: Not Currently     Alcohol/week: 0.0 standard drinks of alcohol     Comment: Not while pregnant       Problem (# of Occurrences) Relation (Name,Age of Onset)    Unknown/Adopted (1) Other    Colon Cancer (2) Maternal Grandmother (50), Mother (46)    No Known Problems (5) Father, Sister, Brother, Maternal Grandfather, Paternal Grandmother              Current Outpatient Medications   Medication Sig Dispense Refill    levonorgestrel (MIRENA) 20 MCG/24HR IUD 1 each (20 mcg) by Intrauterine route once Lot # [id number]  Exp: 2022  NDC: 64089-678-78       No current facility-administered medications for this visit.  "    Allergies   Allergen Reactions    Amoxicillin Anaphylaxis and Hives    Penicillins Anaphylaxis and Hives       Past medical, surgical, social and family histories were reviewed and updated in EPIC.    EXAM:  /78   Ht 1.778 m (5' 10\")   Wt 94.3 kg (208 lb)   Breastfeeding No   BMI 29.84 kg/m     BMI: Body mass index is 29.84 kg/m .    PHYSICAL EXAM:  Constitutional:   Appearance: Well nourished, well developed, alert, in no acute distress  Neck:  Lymph Nodes:  No lymphadenopathy present    Thyroid:  Gland size normal, nontender, no nodules or masses present  on palpation  Chest:  Respiratory Effort:  Breathing unlabored, CTA bilaterally  Cardiovascular:    Heart: Auscultation:  Regular rate, normal rhythm, no murmurs present  Breasts: Axillary Lymph Nodes:  No lymphadenopathy present. and No nodularity, asymmetry or nipple discharge bilaterally.  Gastrointestinal:   Abdominal Examination:  Abdomen nontender to palpation, tone normal without rigidity or guarding, no masses present, umbilicus without lesions   Liver and Spleen:  No hepatomegaly present, liver nontender to palpation    Hernias:  No hernias present  Lymphatic: Lymph Nodes:  No other lymphadenopathy present  Skin:  General Inspection:  No rashes present, no lesions present, no areas of  discoloration  Neurologic:    Mental Status:  Oriented X3.  Normal strength and tone, sensory exam                grossly normal, mentation intact and speech normal.    Psychiatric:   Mentation appears normal and affect normal/bright.         Pelvic Exam:  External Genitalia:     Normal appearance for age, no discharge present, no tenderness present, no inflammatory lesions present, color normal  Vagina:    Normal vaginal vault without central or paravaginal defects, no discharge present, no inflammatory lesions present, no masses present  Bladder:     Nontender to palpation  Urethra:   Urethral Body:  Urethra palpation normal, urethra structural support " normal   Urethral Meatus:  No erythema or lesions present  Cervix:     Appearance healthy, no lesions present, nontender to palpation, no bleeding present, string present  Uterus:     Nontender to palpation, no masses present, position anteflexed, mobility: normal  Adnexa:     No adnexal tenderness present, no adnexal masses present  Perineum:     Perineum within normal limits, no evidence of trauma, no rashes or skin lesions present  Anus:     Anus within normal limits, no hemorrhoids present  Inguinal Lymph Nodes:     No lymphadenopathy present  Pubic Hair:     Normal pubic hair distribution for age  Genitalia and Groin:     No rashes present, no lesions present, no areas of discoloration, no masses present    COUNSELING:   Reviewed preventive health counseling, as reflected in patient instructions  Special attention given to:        Regular exercise       Healthy diet/nutrition       Contraception       (Kristen)menopause management    BMI: Body mass index is 29.84 kg/m .  Weight management plan: Discussed healthy diet and exercise guidelines    ASSESSMENT:  40 year old female with satisfactory annual exam.    ICD-10-CM    1. Encounter for gynecological examination without abnormal finding  Z01.419       2. Presence of 52 mg levonorgestrel-releasing intrauterine device (IUD)  Z97.5       3. BMI 29.0-29.9,adult  Z68.29           PLAN:  Pap is UTD for 3.5 more years.   Can follow routine ASCCP guidelines     Mammo today  Reviewed chance of a finding that would need f/up being more common with first mammo due to now comparisons but still highly more likely that all will be normal.    C.S due again in 9-10 months and will do that with [facility] again    Fasting labs were normal 2.5 yrs ago.  Should plan to repeat them every 3-5 yrs or so  Discussed doing that screening with one of my partners at our office vs finding a PCP and may do the latter just to have someone     Additional health issues addressed at today's visit  include:    Patient mirena still has 3 yrs of efficacy for contraception  Some increased AUB can still subside and it can worsen but not due to any issue other than endometrial atrophy  Sometimes short course, or potentially longer course of low dose E2 can help this if it should worsen    Discussed perimenopause andn expectations in the next few years.  What HRT can and can't hep with and what things are more of a variable benefit  Can do transdermal or even oral e2 when/if that becomes warranted and use the mirena as endometrial protection but only have 5 yrs of data on it for that indication  So if she would consider doing e2 in the future potenially could need earlier mirena swcelsa    Discussed basal metabolic rate with aging and hormone decline, targeted receptor mediation at GLP-1 and similar vs other weight loss meds available from bariatrics or some PCPs    Doesn't feel anything is needed or warranted at this point but happy to know what is common and what to look for in the next few years.      Megha Emanuel MD

## 2025-06-13 ENCOUNTER — OFFICE VISIT (OUTPATIENT)
Dept: OBGYN | Facility: CLINIC | Age: 40
End: 2025-06-13
Attending: OBSTETRICS & GYNECOLOGY
Payer: COMMERCIAL

## 2025-06-13 ENCOUNTER — ANCILLARY PROCEDURE (OUTPATIENT)
Dept: MAMMOGRAPHY | Facility: CLINIC | Age: 40
End: 2025-06-13
Attending: OBSTETRICS & GYNECOLOGY
Payer: COMMERCIAL

## 2025-06-13 ENCOUNTER — APPOINTMENT (OUTPATIENT)
Dept: URBAN - METROPOLITAN AREA CLINIC 256 | Age: 40
Setting detail: DERMATOLOGY
End: 2025-06-13

## 2025-06-13 VITALS
HEIGHT: 70 IN | BODY MASS INDEX: 29.78 KG/M2 | SYSTOLIC BLOOD PRESSURE: 100 MMHG | WEIGHT: 208 LBS | DIASTOLIC BLOOD PRESSURE: 78 MMHG

## 2025-06-13 VITALS — HEIGHT: 70 IN | WEIGHT: 200 LBS

## 2025-06-13 DIAGNOSIS — L91.8 OTHER HYPERTROPHIC DISORDERS OF THE SKIN: ICD-10-CM

## 2025-06-13 DIAGNOSIS — Z12.31 VISIT FOR SCREENING MAMMOGRAM: ICD-10-CM

## 2025-06-13 DIAGNOSIS — L98.8 OTHER SPECIFIED DISORDERS OF THE SKIN AND SUBCUTANEOUS TISSUE: ICD-10-CM

## 2025-06-13 DIAGNOSIS — Z01.419 ENCOUNTER FOR GYNECOLOGICAL EXAMINATION WITHOUT ABNORMAL FINDING: Primary | ICD-10-CM

## 2025-06-13 DIAGNOSIS — Z97.5 PRESENCE OF 52 MG LEVONORGESTREL-RELEASING INTRAUTERINE DEVICE (IUD): ICD-10-CM

## 2025-06-13 PROCEDURE — OTHER BOTOX: OTHER

## 2025-06-13 PROCEDURE — 77067 SCR MAMMO BI INCL CAD: CPT | Mod: TC | Performed by: RADIOLOGY

## 2025-06-13 PROCEDURE — OTHER COUNSELING: OTHER

## 2025-06-13 PROCEDURE — 3078F DIAST BP <80 MM HG: CPT | Performed by: OBSTETRICS & GYNECOLOGY

## 2025-06-13 PROCEDURE — 77063 BREAST TOMOSYNTHESIS BI: CPT | Mod: TC | Performed by: RADIOLOGY

## 2025-06-13 PROCEDURE — OTHER COSMETIC CONSULTATION: BOTOX: OTHER

## 2025-06-13 PROCEDURE — 99396 PREV VISIT EST AGE 40-64: CPT | Performed by: OBSTETRICS & GYNECOLOGY

## 2025-06-13 PROCEDURE — OTHER SKIN TAG REMOVAL: OTHER

## 2025-06-13 PROCEDURE — OTHER PHOTO-DOCUMENTATION: OTHER

## 2025-06-13 PROCEDURE — 3074F SYST BP LT 130 MM HG: CPT | Performed by: OBSTETRICS & GYNECOLOGY

## 2025-06-13 PROCEDURE — OTHER PATIENT SPECIFIC COUNSELING: OTHER

## 2025-06-13 PROCEDURE — OTHER MIPS QUALITY: OTHER

## 2025-06-13 ASSESSMENT — LOCATION DETAILED DESCRIPTION DERM
LOCATION DETAILED: RIGHT SUPERIOR FOREHEAD
LOCATION DETAILED: LEFT SUPERIOR MEDIAL FOREHEAD
LOCATION DETAILED: LEFT AXILLARY VAULT
LOCATION DETAILED: LEFT CENTRAL EYEBROW
LOCATION DETAILED: LEFT INFERIOR LATERAL FOREHEAD
LOCATION DETAILED: LEFT SUPERIOR FOREHEAD
LOCATION DETAILED: LEFT MEDIAL EYEBROW
LOCATION DETAILED: RIGHT FOREHEAD
LOCATION DETAILED: RIGHT MEDIAL EYEBROW
LOCATION DETAILED: LEFT SUPERIOR LATERAL FOREHEAD
LOCATION DETAILED: GLABELLA
LOCATION DETAILED: RIGHT SUPERIOR MEDIAL FOREHEAD
LOCATION DETAILED: RIGHT LATERAL FOREHEAD

## 2025-06-13 ASSESSMENT — LOCATION SIMPLE DESCRIPTION DERM
LOCATION SIMPLE: RIGHT FOREHEAD
LOCATION SIMPLE: LEFT EYEBROW
LOCATION SIMPLE: GLABELLA
LOCATION SIMPLE: RIGHT EYEBROW
LOCATION SIMPLE: LEFT FOREHEAD
LOCATION SIMPLE: LEFT AXILLARY VAULT

## 2025-06-13 ASSESSMENT — LOCATION ZONE DERM
LOCATION ZONE: AXILLAE
LOCATION ZONE: FACE

## 2025-06-24 ENCOUNTER — HOSPITAL ENCOUNTER (OUTPATIENT)
Dept: MAMMOGRAPHY | Facility: CLINIC | Age: 40
Discharge: HOME | End: 2025-06-24
Attending: OBSTETRICS & GYNECOLOGY
Payer: COMMERCIAL

## 2025-06-24 DIAGNOSIS — R92.8 ABNORMAL MAMMOGRAM: ICD-10-CM

## 2025-06-24 PROCEDURE — 76642 ULTRASOUND BREAST LIMITED: CPT | Mod: RT

## 2025-06-24 PROCEDURE — 77065 DX MAMMO INCL CAD UNI: CPT | Mod: RT

## 2025-06-30 ENCOUNTER — APPOINTMENT (OUTPATIENT)
Dept: URBAN - METROPOLITAN AREA CLINIC 256 | Age: 40
Setting detail: DERMATOLOGY
End: 2025-06-30

## 2025-06-30 VITALS — WEIGHT: 200 LBS | HEIGHT: 70 IN

## 2025-06-30 DIAGNOSIS — L98.8 OTHER SPECIFIED DISORDERS OF THE SKIN AND SUBCUTANEOUS TISSUE: ICD-10-CM

## 2025-06-30 DIAGNOSIS — L91.8 OTHER HYPERTROPHIC DISORDERS OF THE SKIN: ICD-10-CM

## 2025-06-30 PROCEDURE — OTHER MIPS QUALITY: OTHER

## 2025-06-30 PROCEDURE — OTHER BOTOX: OTHER

## 2025-06-30 PROCEDURE — OTHER COUNSELING: OTHER

## 2025-06-30 PROCEDURE — OTHER PHOTO-DOCUMENTATION: OTHER

## 2025-06-30 PROCEDURE — OTHER PATIENT SPECIFIC COUNSELING: OTHER

## 2025-06-30 ASSESSMENT — LOCATION ZONE DERM
LOCATION ZONE: FACE
LOCATION ZONE: AXILLAE

## 2025-06-30 ASSESSMENT — LOCATION DETAILED DESCRIPTION DERM
LOCATION DETAILED: LEFT AXILLARY VAULT
LOCATION DETAILED: RIGHT LATERAL FOREHEAD
LOCATION DETAILED: RIGHT INFERIOR LATERAL FOREHEAD

## 2025-06-30 ASSESSMENT — LOCATION SIMPLE DESCRIPTION DERM
LOCATION SIMPLE: RIGHT FOREHEAD
LOCATION SIMPLE: LEFT AXILLARY VAULT